# Patient Record
Sex: FEMALE | Race: WHITE | NOT HISPANIC OR LATINO
[De-identification: names, ages, dates, MRNs, and addresses within clinical notes are randomized per-mention and may not be internally consistent; named-entity substitution may affect disease eponyms.]

---

## 2017-03-29 ENCOUNTER — APPOINTMENT (OUTPATIENT)
Dept: ULTRASOUND IMAGING | Facility: HOSPITAL | Age: 57
End: 2017-03-29

## 2017-06-27 ENCOUNTER — APPOINTMENT (OUTPATIENT)
Dept: ULTRASOUND IMAGING | Facility: HOSPITAL | Age: 57
End: 2017-06-27

## 2017-06-27 ENCOUNTER — OUTPATIENT (OUTPATIENT)
Dept: OUTPATIENT SERVICES | Facility: HOSPITAL | Age: 57
LOS: 1 days | End: 2017-06-27
Payer: COMMERCIAL

## 2017-06-27 PROCEDURE — 76536 US EXAM OF HEAD AND NECK: CPT

## 2017-06-27 PROCEDURE — 76536 US EXAM OF HEAD AND NECK: CPT | Mod: 26

## 2017-10-11 ENCOUNTER — APPOINTMENT (OUTPATIENT)
Dept: OBGYN | Facility: CLINIC | Age: 57
End: 2017-10-11
Payer: COMMERCIAL

## 2017-10-11 VITALS
DIASTOLIC BLOOD PRESSURE: 78 MMHG | HEART RATE: 101 BPM | SYSTOLIC BLOOD PRESSURE: 136 MMHG | BODY MASS INDEX: 31.66 KG/M2 | OXYGEN SATURATION: 98 % | RESPIRATION RATE: 20 BRPM | HEIGHT: 66 IN | WEIGHT: 197 LBS

## 2017-10-11 PROCEDURE — 99396 PREV VISIT EST AGE 40-64: CPT

## 2017-10-11 PROCEDURE — 99213 OFFICE O/P EST LOW 20 MIN: CPT | Mod: 25

## 2017-10-12 DIAGNOSIS — N76.3 SUBACUTE AND CHRONIC VULVITIS: ICD-10-CM

## 2017-10-12 LAB — HPV HIGH+LOW RISK DNA PNL CVX: NOT DETECTED

## 2017-10-18 ENCOUNTER — APPOINTMENT (OUTPATIENT)
Dept: OBGYN | Facility: CLINIC | Age: 57
End: 2017-10-18

## 2017-10-18 VITALS
HEIGHT: 66 IN | HEART RATE: 86 BPM | SYSTOLIC BLOOD PRESSURE: 130 MMHG | DIASTOLIC BLOOD PRESSURE: 80 MMHG | BODY MASS INDEX: 31.66 KG/M2 | OXYGEN SATURATION: 98 % | WEIGHT: 197 LBS

## 2017-10-19 LAB — CYTOLOGY CVX/VAG DOC THIN PREP: NORMAL

## 2017-11-12 ENCOUNTER — FORM ENCOUNTER (OUTPATIENT)
Age: 57
End: 2017-11-12

## 2017-11-13 ENCOUNTER — OUTPATIENT (OUTPATIENT)
Dept: OUTPATIENT SERVICES | Facility: HOSPITAL | Age: 57
LOS: 1 days | End: 2017-11-13
Payer: COMMERCIAL

## 2017-11-13 ENCOUNTER — APPOINTMENT (OUTPATIENT)
Dept: ULTRASOUND IMAGING | Facility: HOSPITAL | Age: 57
End: 2017-11-13
Payer: COMMERCIAL

## 2017-11-13 ENCOUNTER — APPOINTMENT (OUTPATIENT)
Dept: MAMMOGRAPHY | Facility: HOSPITAL | Age: 57
End: 2017-11-13
Payer: COMMERCIAL

## 2017-11-13 PROCEDURE — G0202: CPT | Mod: 26

## 2017-11-13 PROCEDURE — 76830 TRANSVAGINAL US NON-OB: CPT

## 2017-11-13 PROCEDURE — 76856 US EXAM PELVIC COMPLETE: CPT

## 2017-11-13 PROCEDURE — 77067 SCR MAMMO BI INCL CAD: CPT

## 2017-11-13 PROCEDURE — 76830 TRANSVAGINAL US NON-OB: CPT | Mod: 26

## 2017-11-13 PROCEDURE — 76856 US EXAM PELVIC COMPLETE: CPT | Mod: 26

## 2017-11-13 PROCEDURE — 77063 BREAST TOMOSYNTHESIS BI: CPT

## 2017-11-13 PROCEDURE — 77063 BREAST TOMOSYNTHESIS BI: CPT | Mod: 26

## 2017-11-16 ENCOUNTER — FORM ENCOUNTER (OUTPATIENT)
Age: 57
End: 2017-11-16

## 2017-11-16 ENCOUNTER — APPOINTMENT (OUTPATIENT)
Dept: OBGYN | Facility: CLINIC | Age: 57
End: 2017-11-16
Payer: COMMERCIAL

## 2017-11-16 VITALS
SYSTOLIC BLOOD PRESSURE: 140 MMHG | OXYGEN SATURATION: 96 % | HEART RATE: 94 BPM | DIASTOLIC BLOOD PRESSURE: 80 MMHG | RESPIRATION RATE: 18 BRPM

## 2017-11-16 PROCEDURE — 58100 BIOPSY OF UTERUS LINING: CPT

## 2017-11-16 PROCEDURE — 99214 OFFICE O/P EST MOD 30 MIN: CPT | Mod: 25

## 2017-11-17 ENCOUNTER — APPOINTMENT (OUTPATIENT)
Dept: MAMMOGRAPHY | Facility: HOSPITAL | Age: 57
End: 2017-11-17

## 2017-11-17 ENCOUNTER — APPOINTMENT (OUTPATIENT)
Dept: ULTRASOUND IMAGING | Facility: HOSPITAL | Age: 57
End: 2017-11-17
Payer: COMMERCIAL

## 2017-11-17 ENCOUNTER — OUTPATIENT (OUTPATIENT)
Dept: OUTPATIENT SERVICES | Facility: HOSPITAL | Age: 57
LOS: 1 days | End: 2017-11-17
Payer: COMMERCIAL

## 2017-11-17 PROCEDURE — 77066 DX MAMMO INCL CAD BI: CPT

## 2017-11-17 PROCEDURE — 76641 ULTRASOUND BREAST COMPLETE: CPT

## 2017-11-17 PROCEDURE — G0279: CPT | Mod: 26

## 2017-11-17 PROCEDURE — 76641 ULTRASOUND BREAST COMPLETE: CPT | Mod: 26

## 2017-11-17 PROCEDURE — G0204: CPT | Mod: 26

## 2017-11-17 PROCEDURE — G0279: CPT

## 2017-11-27 LAB — CORE LAB BIOPSY: NORMAL

## 2017-11-29 DIAGNOSIS — Z00.8 ENCOUNTER FOR OTHER GENERAL EXAMINATION: ICD-10-CM

## 2018-06-17 ENCOUNTER — EMERGENCY (EMERGENCY)
Facility: HOSPITAL | Age: 58
LOS: 1 days | Discharge: HOME | End: 2018-06-17
Attending: EMERGENCY MEDICINE | Admitting: EMERGENCY MEDICINE
Payer: COMMERCIAL

## 2018-06-17 VITALS
TEMPERATURE: 100 F | RESPIRATION RATE: 16 BRPM | SYSTOLIC BLOOD PRESSURE: 134 MMHG | HEART RATE: 98 BPM | OXYGEN SATURATION: 95 % | DIASTOLIC BLOOD PRESSURE: 83 MMHG

## 2018-06-17 DIAGNOSIS — E89.0 POSTPROCEDURAL HYPOTHYROIDISM: Chronic | ICD-10-CM

## 2018-06-17 PROCEDURE — 99283 EMERGENCY DEPT VISIT LOW MDM: CPT | Mod: 25

## 2018-06-17 PROCEDURE — 99282 EMERGENCY DEPT VISIT SF MDM: CPT | Mod: 25

## 2018-06-17 PROCEDURE — 12001 RPR S/N/AX/GEN/TRNK 2.5CM/<: CPT

## 2018-06-17 PROCEDURE — 90715 TDAP VACCINE 7 YRS/> IM: CPT

## 2018-06-17 PROCEDURE — 90471 IMMUNIZATION ADMIN: CPT

## 2018-06-17 RX ORDER — TETANUS TOXOID, REDUCED DIPHTHERIA TOXOID AND ACELLULAR PERTUSSIS VACCINE, ADSORBED 5; 2.5; 8; 8; 2.5 [IU]/.5ML; [IU]/.5ML; UG/.5ML; UG/.5ML; UG/.5ML
0.5 SUSPENSION INTRAMUSCULAR ONCE
Qty: 0 | Refills: 0 | Status: COMPLETED | OUTPATIENT
Start: 2018-06-17 | End: 2018-06-17

## 2018-06-17 RX ADMIN — TETANUS TOXOID, REDUCED DIPHTHERIA TOXOID AND ACELLULAR PERTUSSIS VACCINE, ADSORBED 0.5 MILLILITER(S): 5; 2.5; 8; 8; 2.5 SUSPENSION INTRAMUSCULAR at 20:50

## 2018-06-17 NOTE — ED PROVIDER NOTE - MEDICAL DECISION MAKING DETAILS
laceration: tetanus, wound care, sutures, return in 7 days for suture removal Mimi: laceration: tetanus, wound care, sutures, return in 7 days for suture removal

## 2018-06-17 NOTE — ED PROVIDER NOTE - SKIN WOUND TYPE
right index finger: proximal laceration, flap, U shap, no signs of FB, tendon intact, positive radial pulses, superficial/LACERATION(S)

## 2018-06-17 NOTE — ED PROVIDER NOTE - OBJECTIVE STATEMENT
58 yr old female with hx of thyroid cancer presents c/o laceration to right index finger. Pt states she was washing a glass, and cut finger on glass just prior to arrival. Tetanus unknown. No other injuries. right hand dominant

## 2018-06-25 ENCOUNTER — EMERGENCY (EMERGENCY)
Facility: HOSPITAL | Age: 58
LOS: 1 days | Discharge: HOME | End: 2018-06-25
Attending: EMERGENCY MEDICINE | Admitting: EMERGENCY MEDICINE
Payer: COMMERCIAL

## 2018-06-25 VITALS
WEIGHT: 196.21 LBS | DIASTOLIC BLOOD PRESSURE: 89 MMHG | HEART RATE: 81 BPM | SYSTOLIC BLOOD PRESSURE: 136 MMHG | OXYGEN SATURATION: 98 % | TEMPERATURE: 99 F | RESPIRATION RATE: 16 BRPM

## 2018-06-25 DIAGNOSIS — E89.0 POSTPROCEDURAL HYPOTHYROIDISM: Chronic | ICD-10-CM

## 2018-06-25 DIAGNOSIS — S61.011D LACERATION WITHOUT FOREIGN BODY OF RIGHT THUMB WITHOUT DAMAGE TO NAIL, SUBSEQUENT ENCOUNTER: ICD-10-CM

## 2018-06-25 PROCEDURE — 99282 EMERGENCY DEPT VISIT SF MDM: CPT

## 2018-06-25 NOTE — ED PROVIDER NOTE - OBJECTIVE STATEMENT
58 y./o F presents to ED fro suture removal from thumb. was sutures 1 week ago, denies pus discharge from wound

## 2018-06-26 NOTE — ED ADULT NURSE NOTE - OBJECTIVE STATEMENT
pt here for removal of sutures denies fever chills or drainage from the wound  offers no complaints at this time

## 2018-07-01 ENCOUNTER — TRANSCRIPTION ENCOUNTER (OUTPATIENT)
Age: 58
End: 2018-07-01

## 2018-11-12 PROBLEM — E03.9 HYPOTHYROIDISM, UNSPECIFIED: Chronic | Status: ACTIVE | Noted: 2018-06-17

## 2018-11-26 ENCOUNTER — APPOINTMENT (OUTPATIENT)
Dept: OBGYN | Facility: CLINIC | Age: 58
End: 2018-11-26
Payer: COMMERCIAL

## 2018-11-26 VITALS
OXYGEN SATURATION: 97 % | DIASTOLIC BLOOD PRESSURE: 80 MMHG | SYSTOLIC BLOOD PRESSURE: 130 MMHG | HEIGHT: 66 IN | WEIGHT: 190 LBS | BODY MASS INDEX: 30.53 KG/M2 | HEART RATE: 82 BPM

## 2018-11-26 PROCEDURE — 99396 PREV VISIT EST AGE 40-64: CPT

## 2018-11-28 LAB — HPV HIGH+LOW RISK DNA PNL CVX: NOT DETECTED

## 2018-12-02 LAB — CYTOLOGY CVX/VAG DOC THIN PREP: NORMAL

## 2019-02-19 ENCOUNTER — APPOINTMENT (OUTPATIENT)
Dept: MAMMOGRAPHY | Facility: HOSPITAL | Age: 59
End: 2019-02-19

## 2019-02-19 ENCOUNTER — APPOINTMENT (OUTPATIENT)
Dept: ULTRASOUND IMAGING | Facility: HOSPITAL | Age: 59
End: 2019-02-19

## 2019-03-12 ENCOUNTER — RX RENEWAL (OUTPATIENT)
Age: 59
End: 2019-03-12

## 2019-04-15 ENCOUNTER — FORM ENCOUNTER (OUTPATIENT)
Age: 59
End: 2019-04-15

## 2019-04-16 ENCOUNTER — OUTPATIENT (OUTPATIENT)
Dept: OUTPATIENT SERVICES | Facility: HOSPITAL | Age: 59
LOS: 1 days | End: 2019-04-16
Payer: MEDICAID

## 2019-04-16 ENCOUNTER — APPOINTMENT (OUTPATIENT)
Dept: ULTRASOUND IMAGING | Facility: HOSPITAL | Age: 59
End: 2019-04-16
Payer: MEDICAID

## 2019-04-16 ENCOUNTER — APPOINTMENT (OUTPATIENT)
Dept: ULTRASOUND IMAGING | Facility: HOSPITAL | Age: 59
End: 2019-04-16

## 2019-04-16 ENCOUNTER — APPOINTMENT (OUTPATIENT)
Dept: MAMMOGRAPHY | Facility: HOSPITAL | Age: 59
End: 2019-04-16
Payer: MEDICAID

## 2019-04-16 DIAGNOSIS — E89.0 POSTPROCEDURAL HYPOTHYROIDISM: Chronic | ICD-10-CM

## 2019-04-16 DIAGNOSIS — Z00.8 ENCOUNTER FOR OTHER GENERAL EXAMINATION: ICD-10-CM

## 2019-04-16 PROCEDURE — 77067 SCR MAMMO BI INCL CAD: CPT | Mod: 26

## 2019-04-16 PROCEDURE — 77063 BREAST TOMOSYNTHESIS BI: CPT

## 2019-04-16 PROCEDURE — 77067 SCR MAMMO BI INCL CAD: CPT

## 2019-04-16 PROCEDURE — 76830 TRANSVAGINAL US NON-OB: CPT | Mod: 26

## 2019-04-16 PROCEDURE — 76856 US EXAM PELVIC COMPLETE: CPT | Mod: 26

## 2019-04-16 PROCEDURE — 77063 BREAST TOMOSYNTHESIS BI: CPT | Mod: 26

## 2019-04-16 PROCEDURE — 76641 ULTRASOUND BREAST COMPLETE: CPT | Mod: 26,50

## 2019-04-16 PROCEDURE — 76830 TRANSVAGINAL US NON-OB: CPT

## 2019-04-16 PROCEDURE — 76641 ULTRASOUND BREAST COMPLETE: CPT

## 2019-04-16 PROCEDURE — 76536 US EXAM OF HEAD AND NECK: CPT

## 2019-04-16 PROCEDURE — 76536 US EXAM OF HEAD AND NECK: CPT | Mod: 26

## 2019-04-16 PROCEDURE — 76856 US EXAM PELVIC COMPLETE: CPT

## 2019-04-24 ENCOUNTER — TRANSCRIPTION ENCOUNTER (OUTPATIENT)
Age: 59
End: 2019-04-24

## 2019-05-02 ENCOUNTER — APPOINTMENT (OUTPATIENT)
Dept: CARDIOLOGY | Facility: CLINIC | Age: 59
End: 2019-05-02
Payer: MEDICAID

## 2019-05-02 ENCOUNTER — NON-APPOINTMENT (OUTPATIENT)
Age: 59
End: 2019-05-02

## 2019-05-02 VITALS — DIASTOLIC BLOOD PRESSURE: 100 MMHG | SYSTOLIC BLOOD PRESSURE: 160 MMHG

## 2019-05-02 VITALS
BODY MASS INDEX: 29.89 KG/M2 | RESPIRATION RATE: 17 BRPM | DIASTOLIC BLOOD PRESSURE: 94 MMHG | HEIGHT: 66 IN | HEART RATE: 82 BPM | SYSTOLIC BLOOD PRESSURE: 153 MMHG | OXYGEN SATURATION: 97 % | WEIGHT: 186 LBS

## 2019-05-02 PROCEDURE — 99204 OFFICE O/P NEW MOD 45 MIN: CPT

## 2019-05-02 PROCEDURE — 93306 TTE W/DOPPLER COMPLETE: CPT

## 2019-05-02 PROCEDURE — 93000 ELECTROCARDIOGRAM COMPLETE: CPT

## 2019-05-02 NOTE — REASON FOR VISIT
[Consultation] : a consultation regarding [FreeTextEntry2] : pt c/o occasional dyspnea at high workload, no chg, no sscp

## 2019-05-02 NOTE — DISCUSSION/SUMMARY
[Hypertension] : hypertension [Outpatient Evaluation] : outpatient evaluation [Ambulatory BP Monitoring] : ambulatory blood pressure monitoring [Stress Testing] : stress testing [Exercise Regimen] : an exercise regimen [Weight Loss] : weight loss [Sodium Restriction] : sodium restriction [Echocardiogram] : an echocardiogram [Treadmill Exercise Test] : a treadmill stress test

## 2019-05-02 NOTE — PHYSICAL EXAM
[General Appearance - Well Developed] : well developed [Well Groomed] : well groomed [Normal Appearance] : normal appearance [No Deformities] : no deformities [General Appearance - Well Nourished] : well nourished [Normal Conjunctiva] : the conjunctiva exhibited no abnormalities [General Appearance - In No Acute Distress] : no acute distress [Eyelids - No Xanthelasma] : the eyelids demonstrated no xanthelasmas [Normal Oral Mucosa] : normal oral mucosa [No Oral Pallor] : no oral pallor [No Oral Cyanosis] : no oral cyanosis [Normal Jugular Venous A Waves Present] : normal jugular venous A waves present [Normal Jugular Venous V Waves Present] : normal jugular venous V waves present [No Jugular Venous Stokes A Waves] : no jugular venous stokes A waves [Normal Rate] : normal [Normal S1] : normal S1 [Normal S2] : normal S2 [No Murmur] : no murmurs heard [2+] : left 2+ [No Abnormalities] : the abdominal aorta was not enlarged and no bruit was heard [No Pitting Edema] : no pitting edema present [Respiration, Rhythm And Depth] : normal respiratory rhythm and effort [Auscultation Breath Sounds / Voice Sounds] : lungs were clear to auscultation bilaterally [Exaggerated Use Of Accessory Muscles For Inspiration] : no accessory muscle use [Abdomen Soft] : soft [Abdomen Tenderness] : non-tender [Abdomen Mass (___ Cm)] : no abdominal mass palpated [Abnormal Walk] : normal gait [Gait - Sufficient For Exercise Testing] : the gait was sufficient for exercise testing [Nail Clubbing] : no clubbing of the fingernails [Petechial Hemorrhages (___cm)] : no petechial hemorrhages [Cyanosis, Localized] : no localized cyanosis [Skin Color & Pigmentation] : normal skin color and pigmentation [] : no rash [No Venous Stasis] : no venous stasis [No Skin Ulcers] : no skin ulcer [Skin Lesions] : no skin lesions [No Xanthoma] : no  xanthoma was observed [Oriented To Time, Place, And Person] : oriented to person, place, and time [Affect] : the affect was normal [Mood] : the mood was normal [No Anxiety] : not feeling anxious [S3] : no S3 [Right Carotid Bruit] : no bruit heard over the right carotid [S4] : no S4 [Left Carotid Bruit] : no bruit heard over the left carotid [Right Femoral Bruit] : no bruit heard over the right femoral artery [Left Femoral Bruit] : no bruit heard over the left femoral artery

## 2019-05-07 ENCOUNTER — APPOINTMENT (OUTPATIENT)
Dept: OBGYN | Facility: CLINIC | Age: 59
End: 2019-05-07

## 2019-05-08 ENCOUNTER — TRANSCRIPTION ENCOUNTER (OUTPATIENT)
Age: 59
End: 2019-05-08

## 2019-05-11 ENCOUNTER — TRANSCRIPTION ENCOUNTER (OUTPATIENT)
Age: 59
End: 2019-05-11

## 2019-05-13 ENCOUNTER — TRANSCRIPTION ENCOUNTER (OUTPATIENT)
Age: 59
End: 2019-05-13

## 2019-05-14 ENCOUNTER — APPOINTMENT (OUTPATIENT)
Dept: SURGERY | Facility: CLINIC | Age: 59
End: 2019-05-14
Payer: MEDICAID

## 2019-05-14 VITALS
HEART RATE: 77 BPM | WEIGHT: 186 LBS | OXYGEN SATURATION: 98 % | TEMPERATURE: 98.1 F | HEIGHT: 66 IN | SYSTOLIC BLOOD PRESSURE: 122 MMHG | DIASTOLIC BLOOD PRESSURE: 80 MMHG | BODY MASS INDEX: 29.89 KG/M2

## 2019-05-14 PROCEDURE — 99202 OFFICE O/P NEW SF 15 MIN: CPT

## 2019-05-14 RX ORDER — MULTIVITAMIN/IRON/FOLIC ACID 18MG-0.4MG
600-400 TABLET ORAL
Refills: 0 | Status: DISCONTINUED | COMMUNITY
End: 2019-05-14

## 2019-05-14 NOTE — REASON FOR VISIT
[Consultation] : a consultation visit No joint pain, swelling or deformity; no limitation of movement

## 2019-05-22 ENCOUNTER — APPOINTMENT (OUTPATIENT)
Dept: CARDIOLOGY | Facility: CLINIC | Age: 59
End: 2019-05-22
Payer: MEDICAID

## 2019-05-22 PROCEDURE — 93015 CV STRESS TEST SUPVJ I&R: CPT

## 2019-05-23 ENCOUNTER — TRANSCRIPTION ENCOUNTER (OUTPATIENT)
Age: 59
End: 2019-05-23

## 2019-06-07 ENCOUNTER — APPOINTMENT (OUTPATIENT)
Dept: SURGERY | Facility: CLINIC | Age: 59
End: 2019-06-07
Payer: MEDICAID

## 2019-06-07 VITALS
SYSTOLIC BLOOD PRESSURE: 140 MMHG | BODY MASS INDEX: 27.32 KG/M2 | WEIGHT: 170 LBS | DIASTOLIC BLOOD PRESSURE: 80 MMHG | HEIGHT: 66 IN | OXYGEN SATURATION: 98 % | HEART RATE: 106 BPM

## 2019-06-07 PROCEDURE — 99213 OFFICE O/P EST LOW 20 MIN: CPT

## 2019-06-11 ENCOUNTER — FORM ENCOUNTER (OUTPATIENT)
Age: 59
End: 2019-06-11

## 2019-06-12 ENCOUNTER — RESULT REVIEW (OUTPATIENT)
Age: 59
End: 2019-06-12

## 2019-06-12 ENCOUNTER — APPOINTMENT (OUTPATIENT)
Dept: ULTRASOUND IMAGING | Facility: IMAGING CENTER | Age: 59
End: 2019-06-12
Payer: MEDICAID

## 2019-06-12 ENCOUNTER — OUTPATIENT (OUTPATIENT)
Dept: OUTPATIENT SERVICES | Facility: HOSPITAL | Age: 59
LOS: 1 days | End: 2019-06-12
Payer: MEDICAID

## 2019-06-12 DIAGNOSIS — R59.0 LOCALIZED ENLARGED LYMPH NODES: ICD-10-CM

## 2019-06-12 DIAGNOSIS — E89.0 POSTPROCEDURAL HYPOTHYROIDISM: Chronic | ICD-10-CM

## 2019-06-12 PROCEDURE — 88305 TISSUE EXAM BY PATHOLOGIST: CPT | Mod: 26

## 2019-06-12 PROCEDURE — 10005 FNA BX W/US GDN 1ST LES: CPT

## 2019-06-12 PROCEDURE — 88305 TISSUE EXAM BY PATHOLOGIST: CPT

## 2019-06-12 PROCEDURE — 88188 FLOWCYTOMETRY/READ 9-15: CPT

## 2019-06-12 PROCEDURE — 88173 CYTOPATH EVAL FNA REPORT: CPT | Mod: 26

## 2019-06-12 PROCEDURE — 87205 SMEAR GRAM STAIN: CPT

## 2019-06-12 PROCEDURE — 88184 FLOWCYTOMETRY/ TC 1 MARKER: CPT

## 2019-06-12 PROCEDURE — 88172 CYTP DX EVAL FNA 1ST EA SITE: CPT

## 2019-06-12 PROCEDURE — 88173 CYTOPATH EVAL FNA REPORT: CPT

## 2019-06-12 PROCEDURE — 88185 FLOWCYTOMETRY/TC ADD-ON: CPT

## 2019-06-12 NOTE — REASON FOR VISIT
[Initial Consultation] : an initial consultation for [Other: _____] : [unfilled] [FreeTextEntry2] : cervical Lymph nodes

## 2019-06-12 NOTE — ASSESSMENT
[FreeTextEntry1] : Patient with prior hx of thyroid cancer with abnormal LN seen on US with normal PE and CT scan.  I have requested an US guided FNA of right submandibular LN if seen by Dr. Mark Cary.  If no suspicious LNs seen biopsy will not be performed.  Patient will contact my office to review results and determine need for intervention.

## 2019-06-12 NOTE — PHYSICAL EXAM
[Normal] : assessment of respiratory effort is normal [de-identified] : no cervical or supraclavicular adenopathy, trachea midline, incision well healed, no palpable masses.

## 2019-06-12 NOTE — CONSULT LETTER
[Dear  ___] : Dear  [unfilled], [Consult Closing:] : Thank you very much for allowing me to participate in the care of this patient.  If you have any questions, please do not hesitate to contact me. [Consult Letter:] : I had the pleasure of evaluating your patient, [unfilled]. [Please see my note below.] : Please see my note below. [DrAna  ___] : Dr. MIRANDA [FreeTextEntry3] : Sincerely yours,\par \par Gladis Carpenter MD, FACS\par Assistant Professor of Surgery\par [facility] [FreeTextEntry2] : Dr. Zack Napoles

## 2019-06-12 NOTE — HISTORY OF PRESENT ILLNESS
[de-identified] : Patient referred by Dr. Napoles for evaluation of cervical LNs after thyroidectomy 2008 for MNG with incidental thyroid cancer. Patient has been followed by Dr Rain with last US 4/2019 : right submandibular 2.2cm abn lymph node and left 1.1 and 1.2cm LNs.  CT of neck 5/23/19 no abnormal LNs seen.  Denies F/C/S, change in weight, recent dental work or history of skin cancer,.

## 2019-06-13 LAB — TM INTERPRETATION: SIGNIFICANT CHANGE UP

## 2019-09-12 ENCOUNTER — RX RENEWAL (OUTPATIENT)
Age: 59
End: 2019-09-12

## 2020-01-13 ENCOUNTER — RX RENEWAL (OUTPATIENT)
Age: 60
End: 2020-01-13

## 2020-02-21 ENCOUNTER — TRANSCRIPTION ENCOUNTER (OUTPATIENT)
Age: 60
End: 2020-02-21

## 2020-02-24 ENCOUNTER — TRANSCRIPTION ENCOUNTER (OUTPATIENT)
Age: 60
End: 2020-02-24

## 2020-05-14 ENCOUNTER — RX RENEWAL (OUTPATIENT)
Age: 60
End: 2020-05-14

## 2020-11-10 ENCOUNTER — RX RENEWAL (OUTPATIENT)
Age: 60
End: 2020-11-10

## 2021-03-01 ENCOUNTER — APPOINTMENT (OUTPATIENT)
Dept: OBGYN | Facility: CLINIC | Age: 61
End: 2021-03-01

## 2021-04-02 ENCOUNTER — TRANSCRIPTION ENCOUNTER (OUTPATIENT)
Age: 61
End: 2021-04-02

## 2021-05-02 ENCOUNTER — TRANSCRIPTION ENCOUNTER (OUTPATIENT)
Age: 61
End: 2021-05-02

## 2021-05-03 ENCOUNTER — RX RENEWAL (OUTPATIENT)
Age: 61
End: 2021-05-03

## 2021-11-15 ENCOUNTER — RX RENEWAL (OUTPATIENT)
Age: 61
End: 2021-11-15

## 2022-04-28 ENCOUNTER — RX RENEWAL (OUTPATIENT)
Age: 62
End: 2022-04-28

## 2022-05-18 ENCOUNTER — NON-APPOINTMENT (OUTPATIENT)
Age: 62
End: 2022-05-18

## 2022-05-22 ENCOUNTER — RX RENEWAL (OUTPATIENT)
Age: 62
End: 2022-05-22

## 2022-06-16 ENCOUNTER — NON-APPOINTMENT (OUTPATIENT)
Age: 62
End: 2022-06-16

## 2022-06-16 ENCOUNTER — RX RENEWAL (OUTPATIENT)
Age: 62
End: 2022-06-16

## 2022-06-16 ENCOUNTER — APPOINTMENT (OUTPATIENT)
Dept: CARDIOLOGY | Facility: CLINIC | Age: 62
End: 2022-06-16
Payer: COMMERCIAL

## 2022-06-16 VITALS
BODY MASS INDEX: 31.5 KG/M2 | HEIGHT: 66 IN | RESPIRATION RATE: 19 BRPM | SYSTOLIC BLOOD PRESSURE: 164 MMHG | OXYGEN SATURATION: 99 % | WEIGHT: 196 LBS | HEART RATE: 82 BPM | DIASTOLIC BLOOD PRESSURE: 95 MMHG | TEMPERATURE: 96.2 F

## 2022-06-16 VITALS — SYSTOLIC BLOOD PRESSURE: 165 MMHG | DIASTOLIC BLOOD PRESSURE: 90 MMHG

## 2022-06-16 PROCEDURE — 93000 ELECTROCARDIOGRAM COMPLETE: CPT

## 2022-06-16 PROCEDURE — 99215 OFFICE O/P EST HI 40 MIN: CPT

## 2022-06-16 RX ORDER — VALSARTAN 160 MG/1
160 TABLET, COATED ORAL DAILY
Qty: 30 | Refills: 3 | Status: DISCONTINUED | COMMUNITY
Start: 2019-05-22 | End: 2022-06-16

## 2022-06-16 RX ORDER — LOSARTAN POTASSIUM 50 MG/1
50 TABLET, FILM COATED ORAL DAILY
Qty: 90 | Refills: 1 | Status: DISCONTINUED | COMMUNITY
Start: 2020-05-13 | End: 2022-06-16

## 2022-06-16 RX ORDER — IRBESARTAN 150 MG/1
150 TABLET ORAL
Qty: 90 | Refills: 0 | Status: DISCONTINUED | COMMUNITY
Start: 2020-05-13 | End: 2022-06-16

## 2022-06-17 NOTE — DISCUSSION/SUMMARY
[Hypertension] : hypertension [Outpatient Evaluation] : outpatient evaluation [Ambulatory BP Monitoring] : ambulatory blood pressure monitoring [Exercise Regimen] : an exercise regimen [Weight Loss] : weight loss [Echocardiogram] : an echocardiogram [Minutes Spent: ___] : for [unfilled] ~Uminutes [FreeTextEntry2] : last seen 2019

## 2022-06-17 NOTE — PHYSICAL EXAM
[General Appearance - Well Developed] : well developed [Normal Appearance] : normal appearance [Well Groomed] : well groomed [General Appearance - Well Nourished] : well nourished [No Deformities] : no deformities [General Appearance - In No Acute Distress] : no acute distress [Normal Conjunctiva] : the conjunctiva exhibited no abnormalities [Eyelids - No Xanthelasma] : the eyelids demonstrated no xanthelasmas [Normal Oral Mucosa] : normal oral mucosa [No Oral Pallor] : no oral pallor [No Oral Cyanosis] : no oral cyanosis [Normal Jugular Venous A Waves Present] : normal jugular venous A waves present [Normal Jugular Venous V Waves Present] : normal jugular venous V waves present [No Jugular Venous Stokes A Waves] : no jugular venous stokes A waves [Normal Rate] : normal [Normal S1] : normal S1 [Normal S2] : normal S2 [No Murmur] : no murmurs heard [2+] : left 2+ [No Abnormalities] : the abdominal aorta was not enlarged and no bruit was heard [No Pitting Edema] : no pitting edema present [Respiration, Rhythm And Depth] : normal respiratory rhythm and effort [Exaggerated Use Of Accessory Muscles For Inspiration] : no accessory muscle use [Auscultation Breath Sounds / Voice Sounds] : lungs were clear to auscultation bilaterally [Abdomen Soft] : soft [Abdomen Tenderness] : non-tender [Abdomen Mass (___ Cm)] : no abdominal mass palpated [Abnormal Walk] : normal gait [Gait - Sufficient For Exercise Testing] : the gait was sufficient for exercise testing [Nail Clubbing] : no clubbing of the fingernails [Cyanosis, Localized] : no localized cyanosis [Petechial Hemorrhages (___cm)] : no petechial hemorrhages [Skin Color & Pigmentation] : normal skin color and pigmentation [] : no rash [No Venous Stasis] : no venous stasis [Skin Lesions] : no skin lesions [No Skin Ulcers] : no skin ulcer [No Xanthoma] : no  xanthoma was observed [Oriented To Time, Place, And Person] : oriented to person, place, and time [Affect] : the affect was normal [Mood] : the mood was normal [No Anxiety] : not feeling anxious [S3] : no S3 [S4] : no S4 [Right Carotid Bruit] : no bruit heard over the right carotid [Left Carotid Bruit] : no bruit heard over the left carotid [Right Femoral Bruit] : no bruit heard over the right femoral artery [Left Femoral Bruit] : no bruit heard over the left femoral artery

## 2022-06-17 NOTE — REASON FOR VISIT
[Consultation] : a consultation regarding [FreeTextEntry2] : pt c/o occasional dyspnea at high workload, no chg, no sscp, last seen 2019

## 2022-07-05 ENCOUNTER — APPOINTMENT (OUTPATIENT)
Dept: OBGYN | Facility: CLINIC | Age: 62
End: 2022-07-05

## 2022-07-05 VITALS
DIASTOLIC BLOOD PRESSURE: 72 MMHG | WEIGHT: 190 LBS | SYSTOLIC BLOOD PRESSURE: 118 MMHG | OXYGEN SATURATION: 98 % | BODY MASS INDEX: 30.53 KG/M2 | HEART RATE: 81 BPM | TEMPERATURE: 97.5 F | HEIGHT: 66 IN

## 2022-07-05 PROCEDURE — 99396 PREV VISIT EST AGE 40-64: CPT

## 2022-07-05 RX ORDER — ACETAMINOPHEN 325 MG
TABLET ORAL
Refills: 0 | Status: DISCONTINUED | COMMUNITY
End: 2022-07-05

## 2022-07-05 NOTE — PHYSICAL EXAM
[Chaperone Present] : A chaperone was present in the examining room during all aspects of the physical examination [FreeTextEntry1] : CINDY Perdue [Appropriately responsive] : appropriately responsive [Alert] : alert [No Acute Distress] : no acute distress [No Lymphadenopathy] : no lymphadenopathy [Non-tender] : non-tender [Non-distended] : non-distended [No HSM] : No HSM [No Lesions] : no lesions [No Mass] : no mass [Oriented x3] : oriented x3 [Examination Of The Breasts] : a normal appearance [No Discharge] : no discharge [No Masses] : no breast masses were palpable [Labia Majora] : normal [Labia Minora] : normal [Atrophy] : atrophy [No Bleeding] : There was no active vaginal bleeding [Soft] : soft [Normal] : normal [Normal Position] : in a normal position [Tenderness] : nontender [Enlarged ___ wks] : not enlarged [Mass ___ cm] : no uterine mass was palpated [Uterine Adnexae] : normal

## 2022-07-05 NOTE — HISTORY OF PRESENT ILLNESS
[N] : Patient is not sexually active [LMPDate] : age 57 [[facility] : 2 [[facility]xLiving] : 2 [PGHxABInduced] : 1 [FreeTextEntry1] :  X 2

## 2022-07-10 LAB
CYTOLOGY CVX/VAG DOC THIN PREP: NORMAL
HPV HIGH+LOW RISK DNA PNL CVX: NOT DETECTED

## 2022-07-18 ENCOUNTER — APPOINTMENT (OUTPATIENT)
Dept: CT IMAGING | Facility: HOSPITAL | Age: 62
End: 2022-07-18

## 2022-07-18 ENCOUNTER — APPOINTMENT (OUTPATIENT)
Dept: RADIOLOGY | Facility: HOSPITAL | Age: 62
End: 2022-07-18

## 2022-07-18 ENCOUNTER — APPOINTMENT (OUTPATIENT)
Dept: ULTRASOUND IMAGING | Facility: HOSPITAL | Age: 62
End: 2022-07-18

## 2022-07-18 ENCOUNTER — OUTPATIENT (OUTPATIENT)
Dept: OUTPATIENT SERVICES | Facility: HOSPITAL | Age: 62
LOS: 1 days | End: 2022-07-18
Payer: COMMERCIAL

## 2022-07-18 DIAGNOSIS — E89.0 POSTPROCEDURAL HYPOTHYROIDISM: Chronic | ICD-10-CM

## 2022-07-18 DIAGNOSIS — Z00.8 ENCOUNTER FOR OTHER GENERAL EXAMINATION: ICD-10-CM

## 2022-07-18 PROCEDURE — 77080 DXA BONE DENSITY AXIAL: CPT

## 2022-07-18 PROCEDURE — 76700 US EXAM ABDOM COMPLETE: CPT | Mod: 26

## 2022-07-18 PROCEDURE — 77080 DXA BONE DENSITY AXIAL: CPT | Mod: 26

## 2022-07-18 PROCEDURE — 76700 US EXAM ABDOM COMPLETE: CPT

## 2022-07-28 ENCOUNTER — OUTPATIENT (OUTPATIENT)
Dept: OUTPATIENT SERVICES | Facility: HOSPITAL | Age: 62
LOS: 1 days | End: 2022-07-28
Payer: COMMERCIAL

## 2022-07-28 ENCOUNTER — APPOINTMENT (OUTPATIENT)
Dept: ULTRASOUND IMAGING | Facility: HOSPITAL | Age: 62
End: 2022-07-28

## 2022-07-28 DIAGNOSIS — Z00.8 ENCOUNTER FOR OTHER GENERAL EXAMINATION: ICD-10-CM

## 2022-07-28 DIAGNOSIS — E89.0 POSTPROCEDURAL HYPOTHYROIDISM: Chronic | ICD-10-CM

## 2022-07-28 PROCEDURE — 76536 US EXAM OF HEAD AND NECK: CPT

## 2022-07-28 PROCEDURE — 76536 US EXAM OF HEAD AND NECK: CPT | Mod: 26

## 2022-08-15 ENCOUNTER — APPOINTMENT (OUTPATIENT)
Dept: ULTRASOUND IMAGING | Facility: HOSPITAL | Age: 62
End: 2022-08-15

## 2022-08-15 ENCOUNTER — RESULT REVIEW (OUTPATIENT)
Age: 62
End: 2022-08-15

## 2022-08-15 ENCOUNTER — APPOINTMENT (OUTPATIENT)
Dept: MAMMOGRAPHY | Facility: HOSPITAL | Age: 62
End: 2022-08-15

## 2022-08-15 ENCOUNTER — OUTPATIENT (OUTPATIENT)
Dept: OUTPATIENT SERVICES | Facility: HOSPITAL | Age: 62
LOS: 1 days | End: 2022-08-15
Payer: COMMERCIAL

## 2022-08-15 DIAGNOSIS — E89.0 POSTPROCEDURAL HYPOTHYROIDISM: Chronic | ICD-10-CM

## 2022-08-15 DIAGNOSIS — Z12.31 ENCOUNTER FOR SCREENING MAMMOGRAM FOR MALIGNANT NEOPLASM OF BREAST: ICD-10-CM

## 2022-08-15 PROCEDURE — 77067 SCR MAMMO BI INCL CAD: CPT | Mod: 26

## 2022-08-15 PROCEDURE — 77063 BREAST TOMOSYNTHESIS BI: CPT | Mod: 26

## 2022-08-15 PROCEDURE — 76641 ULTRASOUND BREAST COMPLETE: CPT | Mod: 26,50

## 2022-08-15 PROCEDURE — 77067 SCR MAMMO BI INCL CAD: CPT

## 2022-08-15 PROCEDURE — 77063 BREAST TOMOSYNTHESIS BI: CPT

## 2022-08-15 PROCEDURE — 76641 ULTRASOUND BREAST COMPLETE: CPT

## 2022-08-26 ENCOUNTER — RESULT REVIEW (OUTPATIENT)
Age: 62
End: 2022-08-26

## 2022-08-26 ENCOUNTER — OUTPATIENT (OUTPATIENT)
Dept: OUTPATIENT SERVICES | Facility: HOSPITAL | Age: 62
LOS: 1 days | End: 2022-08-26
Payer: COMMERCIAL

## 2022-08-26 ENCOUNTER — APPOINTMENT (OUTPATIENT)
Dept: ULTRASOUND IMAGING | Facility: CLINIC | Age: 62
End: 2022-08-26
Payer: COMMERCIAL

## 2022-08-26 DIAGNOSIS — R92.8 OTHER ABNORMAL AND INCONCLUSIVE FINDINGS ON DIAGNOSTIC IMAGING OF BREAST: ICD-10-CM

## 2022-08-26 DIAGNOSIS — Z00.8 ENCOUNTER FOR OTHER GENERAL EXAMINATION: ICD-10-CM

## 2022-08-26 DIAGNOSIS — E89.0 POSTPROCEDURAL HYPOTHYROIDISM: Chronic | ICD-10-CM

## 2022-08-26 PROCEDURE — 19083 BX BREAST 1ST LESION US IMAG: CPT

## 2022-08-26 PROCEDURE — 88305 TISSUE EXAM BY PATHOLOGIST: CPT | Mod: 26

## 2022-08-26 PROCEDURE — 77065 DX MAMMO INCL CAD UNI: CPT | Mod: 26,1L,LT

## 2022-08-26 PROCEDURE — 19083 BX BREAST 1ST LESION US IMAG: CPT | Mod: 1L,LT

## 2022-08-26 PROCEDURE — 88305 TISSUE EXAM BY PATHOLOGIST: CPT

## 2022-08-26 PROCEDURE — 88360 TUMOR IMMUNOHISTOCHEM/MANUAL: CPT | Mod: 26

## 2022-08-26 PROCEDURE — 77065 DX MAMMO INCL CAD UNI: CPT

## 2022-08-26 PROCEDURE — 88360 TUMOR IMMUNOHISTOCHEM/MANUAL: CPT

## 2022-08-29 LAB — SURGICAL PATHOLOGY STUDY: SIGNIFICANT CHANGE UP

## 2022-09-01 ENCOUNTER — RX RENEWAL (OUTPATIENT)
Age: 62
End: 2022-09-01

## 2022-09-06 ENCOUNTER — LABORATORY RESULT (OUTPATIENT)
Age: 62
End: 2022-09-06

## 2022-09-06 ENCOUNTER — APPOINTMENT (OUTPATIENT)
Dept: PLASTIC SURGERY | Facility: CLINIC | Age: 62
End: 2022-09-06

## 2022-09-06 ENCOUNTER — NON-APPOINTMENT (OUTPATIENT)
Age: 62
End: 2022-09-06

## 2022-09-06 VITALS
OXYGEN SATURATION: 96 % | HEART RATE: 72 BPM | SYSTOLIC BLOOD PRESSURE: 154 MMHG | DIASTOLIC BLOOD PRESSURE: 98 MMHG | BODY MASS INDEX: 28.12 KG/M2 | TEMPERATURE: 98.2 F | WEIGHT: 175 LBS | HEIGHT: 66 IN

## 2022-09-06 PROCEDURE — 99205 OFFICE O/P NEW HI 60 MIN: CPT

## 2022-09-06 NOTE — PHYSICAL EXAM
[Normocephalic] : normocephalic [Atraumatic] : atraumatic [Supple] : supple [No Supraclavicular Adenopathy] : no supraclavicular adenopathy [Examined in the supine and seated position] : examined in the supine and seated position [No dominant masses] : no dominant masses in right breast  [No Nipple Retraction] : no left nipple retraction [No Nipple Discharge] : no left nipple discharge [No Axillary Lymphadenopathy] : no left axillary lymphadenopathy [No Edema] : no edema [No Rashes] : no rashes [No Ulceration] : no ulceration [Bra Size: ___] : Bra Size: [unfilled] [Clear to Auscultation Bilat] : clear to auscultation bilaterally [Normal Sinus Rhythm] : normal sinus rhythm [Normal S1, S2] : normal S1 and S2 [No Gallops] : no gallops [No Rubs] : no pericardial rub [EOMI] : extra ocular movement intact [PERRL] : pupils equal, round and reactive to light [Sclera nonicteric] : sclera nonicteric [No Thyromegaly] : no thyromegaly [Asymmetrical] : asymmetrical [de-identified] : Left breast is larger than the right. Left lower outer ecchymosis. Left 3:00 (N + 9.5 cm) breast core biopsy scar. Left 3:30 (N + 6.5 cm) subtle, palpable mass 2.5 x 1.8 cm, consistent with site of recently biopsied tumor.

## 2022-09-06 NOTE — HISTORY OF PRESENT ILLNESS
[FreeTextEntry1] : Patient is a 62 year old female with recent diagnosis of moderately differentiated invasive ductal carcinoma of her left breast, found on routine annual imaging. \par She is referred to me today by her OBGYN, Dr. Joby Bellamy. \par She has a history of thyroid cancer (dx in 2009), treated with total thyroidectomy. \par Family history of breast cancer in her paternal grandmother (dx ~ 87 years old) and paternal cousin (dx ~40 years old). \par Family history of pancreatic cancer in her maternal uncle, diagnosed at age 70. \par 2 of the patient's sisters have had genetic testing, both BRCA -. \par [facility]%\par A 1.1 cm dense nodular density is noted in the left upper outer quadrant posterior depth for which ultrasound examination is to follow.\par Stable focal area of asymmetric density is seen in the right central breast middle depth. Stable grouping of calcifications is noted in the left upper inner quadrant anterior depth. There is no evidence for skin thickening or nipple retraction.\par Bilateral breast ultrasound: Right breast- at the 11-12:00 position 2 to 3 cm from the nipple a stable 7 mm cyst is noted. Left breast- at the 2:00 position 8 cm from the nipple a 1 cm slightly lobulated hypoechoic lesion is seen. This corresponds to the mammographic finding in this region. Ultrasound-guided core biopsy is recommended. Bi-rads 4B. \par 8/26/2022 US guided left breast 3:00 (8cmFN) core biopsy: A ribbon shaped marking clip was placed. 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 Kate score 6/9 (3+1+1) in this limited material. Invasive tumor measures at least 9 mm. DCIS not present. These results are concordant. Surgical/oncologic management is recommended and consideration to presurgical MRI. ER+/GA+/Her-2 negative. \par She is here today with her sister, Tanja Loyola.

## 2022-09-06 NOTE — CONSULT LETTER
[Dear  ___] : Dear  [unfilled], [Consult Letter:] : I had the pleasure of evaluating your patient, [unfilled]. [Please see my note below.] : Please see my note below. [Consult Closing:] : Thank you very much for allowing me to participate in the care of this patient.  If you have any questions, please do not hesitate to contact me. [Sincerely,] : Sincerely, [DrAna  ___] : Dr. MIRANDA [FreeTextEntry3] : Susan M. Palleschi, MD, FACS\par Division of Breast Surgery\par Director, Breast Surgery\par [facility]\par [address] [place] [address]\par [address]\[place] [address]\par (Phone) [phone]\par (Fax) [phone]

## 2022-09-06 NOTE — ASSESSMENT
[FreeTextEntry1] : left breast invasive ductal carcinoma\par Clinically stage 1\par \par 1. The surgical treatment options of left Osiris  localization wide excision lumpectomy, left axillary sentinel lymph node (LN) biopsy, possible left axillary LN dissection followed by post-operative XRT (to reduce risk of local recurrence), vs. left total mastectomy, left axillary sentinel LN biopsy, possible left axillary LN dissection, vs. bilateral total mastectomies, left axillary sentinel LN biopsies, if she should so choose, were discussed in detail. The indications, alternatives, benefits and risks were discussed, including but not limited to bleeding, infection, pain, scar, swelling, numbness, change in breast appearance, recurrence, potential need for additional surgery and lymphedema. The breast cancer booklet and postoperative instructions were reviewed and provided to the patient. \par 2. The lymphedema instruction sheet was reviewed and provided.\par 3. I discussed with her the potential for postoperative radiation therapy.\par 4. I discussed with her the potential for postoperative adjuvant therapy, including the possibility of chemotherapy and/or anti-hormonal therapy as indicated. \par 5. Slide review: to be requested if patient opts to proceed.\par 6. Bilateral breast MRI: advise preop to rule out additional areas of disease. Rx provided, Curt\par 7. Genetic testing: Discussed with her in great detail; advise genetic testing; drawn today.\par 8. Reconstruction: discussed with her the option of reconstruction if she undergoes mastectomy(ies).\par 9. CXR: Rx provided, to be done preop as screening for distant metastatic cancer.\par 10. Complete Metabolic panel (CMP): drawn today, to evaluate for abnormalities suggestive of distant metastatic disease. \par 11. Discussed possible oncoplastic reconstruction to achieve optimal cosmetic result and to address her asymmetry. \par 12. Discussed potential for nipple areolar sparing approach pending evaluation of the plastic surgeon. \par 13. I will contact her with the results of the genetic testing, CMP, CXR and MRI and provide further recommendations at that time.\par \par This patient encounter took 80 minutes today to perform records review, chart preparation, clinical encounter, coordination of care and documentation.

## 2022-09-07 ENCOUNTER — NON-APPOINTMENT (OUTPATIENT)
Age: 62
End: 2022-09-07

## 2022-09-10 ENCOUNTER — APPOINTMENT (OUTPATIENT)
Dept: RADIOLOGY | Facility: CLINIC | Age: 62
End: 2022-09-10

## 2022-09-10 ENCOUNTER — OUTPATIENT (OUTPATIENT)
Dept: OUTPATIENT SERVICES | Facility: HOSPITAL | Age: 62
LOS: 1 days | End: 2022-09-10
Payer: COMMERCIAL

## 2022-09-10 ENCOUNTER — APPOINTMENT (OUTPATIENT)
Dept: MRI IMAGING | Facility: CLINIC | Age: 62
End: 2022-09-10

## 2022-09-10 DIAGNOSIS — Z00.8 ENCOUNTER FOR OTHER GENERAL EXAMINATION: ICD-10-CM

## 2022-09-10 DIAGNOSIS — C50.919 MALIGNANT NEOPLASM OF UNSPECIFIED SITE OF UNSPECIFIED FEMALE BREAST: ICD-10-CM

## 2022-09-10 DIAGNOSIS — E89.0 POSTPROCEDURAL HYPOTHYROIDISM: Chronic | ICD-10-CM

## 2022-09-10 PROCEDURE — 77049 MRI BREAST C-+ W/CAD BI: CPT | Mod: 26

## 2022-09-10 PROCEDURE — C8937: CPT

## 2022-09-10 PROCEDURE — 71045 X-RAY EXAM CHEST 1 VIEW: CPT | Mod: 26

## 2022-09-10 PROCEDURE — A9585: CPT

## 2022-09-10 PROCEDURE — 71045 X-RAY EXAM CHEST 1 VIEW: CPT

## 2022-09-10 PROCEDURE — C8908: CPT

## 2022-09-14 ENCOUNTER — NON-APPOINTMENT (OUTPATIENT)
Age: 62
End: 2022-09-14

## 2022-09-28 ENCOUNTER — NON-APPOINTMENT (OUTPATIENT)
Age: 62
End: 2022-09-28

## 2022-09-29 ENCOUNTER — APPOINTMENT (OUTPATIENT)
Dept: PLASTIC SURGERY | Facility: CLINIC | Age: 62
End: 2022-09-29

## 2022-09-29 VITALS
WEIGHT: 175 LBS | HEIGHT: 66 IN | BODY MASS INDEX: 28.12 KG/M2 | DIASTOLIC BLOOD PRESSURE: 75 MMHG | HEART RATE: 71 BPM | SYSTOLIC BLOOD PRESSURE: 163 MMHG | OXYGEN SATURATION: 97 % | TEMPERATURE: 97.1 F

## 2022-09-29 PROCEDURE — 99205 OFFICE O/P NEW HI 60 MIN: CPT

## 2022-10-05 ENCOUNTER — OUTPATIENT (OUTPATIENT)
Dept: OUTPATIENT SERVICES | Facility: HOSPITAL | Age: 62
LOS: 1 days | End: 2022-10-05
Payer: COMMERCIAL

## 2022-10-05 VITALS
HEIGHT: 64.5 IN | OXYGEN SATURATION: 97 % | HEART RATE: 76 BPM | WEIGHT: 188.94 LBS | TEMPERATURE: 97 F | RESPIRATION RATE: 17 BRPM | SYSTOLIC BLOOD PRESSURE: 155 MMHG | DIASTOLIC BLOOD PRESSURE: 92 MMHG
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
## 2022-10-05 DIAGNOSIS — Z90.12 ACQUIRED ABSENCE OF LEFT BREAST AND NIPPLE: ICD-10-CM

## 2022-10-05 DIAGNOSIS — I10 ESSENTIAL (PRIMARY) HYPERTENSION: ICD-10-CM

## 2022-10-05 DIAGNOSIS — E89.0 POSTPROCEDURAL HYPOTHYROIDISM: Chronic | ICD-10-CM

## 2022-10-05 DIAGNOSIS — F41.9 ANXIETY DISORDER, UNSPECIFIED: ICD-10-CM

## 2022-10-05 DIAGNOSIS — Z80.3 FAMILY HISTORY OF MALIGNANT NEOPLASM OF BREAST: ICD-10-CM

## 2022-10-05 DIAGNOSIS — Z01.818 ENCOUNTER FOR OTHER PREPROCEDURAL EXAMINATION: ICD-10-CM

## 2022-10-05 DIAGNOSIS — E03.9 HYPOTHYROIDISM, UNSPECIFIED: ICD-10-CM

## 2022-10-05 DIAGNOSIS — C50.912 MALIGNANT NEOPLASM OF UNSPECIFIED SITE OF LEFT FEMALE BREAST: ICD-10-CM

## 2022-10-05 DIAGNOSIS — S21.002A UNSPECIFIED OPEN WOUND OF LEFT BREAST, INITIAL ENCOUNTER: ICD-10-CM

## 2022-10-05 DIAGNOSIS — E78.5 HYPERLIPIDEMIA, UNSPECIFIED: ICD-10-CM
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
## 2022-10-05 LAB
ALBUMIN SERPL ELPH-MCNC: 4.2 G/DL — SIGNIFICANT CHANGE UP (ref 3.3–5)
ALP SERPL-CCNC: 52 U/L — SIGNIFICANT CHANGE UP (ref 40–120)
ALT FLD-CCNC: 18 U/L — SIGNIFICANT CHANGE UP (ref 10–45)
ANION GAP SERPL CALC-SCNC: 10 MMOL/L — SIGNIFICANT CHANGE UP (ref 5–17)
AST SERPL-CCNC: 37 U/L — SIGNIFICANT CHANGE UP (ref 10–40)
BILIRUB SERPL-MCNC: 0.6 MG/DL — SIGNIFICANT CHANGE UP (ref 0.2–1.2)
BUN SERPL-MCNC: 22 MG/DL — SIGNIFICANT CHANGE UP (ref 7–23)
CALCIUM SERPL-MCNC: 8.9 MG/DL — SIGNIFICANT CHANGE UP (ref 8.4–10.5)
CHLORIDE SERPL-SCNC: 104 MMOL/L — SIGNIFICANT CHANGE UP (ref 96–108)
CO2 SERPL-SCNC: 29 MMOL/L — SIGNIFICANT CHANGE UP (ref 22–31)
CREAT SERPL-MCNC: 0.97 MG/DL — SIGNIFICANT CHANGE UP (ref 0.5–1.3)
EGFR: 66 ML/MIN/1.73M2 — SIGNIFICANT CHANGE UP
GLUCOSE SERPL-MCNC: 96 MG/DL — SIGNIFICANT CHANGE UP (ref 70–99)
HCT VFR BLD CALC: 41 % — SIGNIFICANT CHANGE UP (ref 34.5–45)
HGB BLD-MCNC: 13.7 G/DL — SIGNIFICANT CHANGE UP (ref 11.5–15.5)
MCHC RBC-ENTMCNC: 33 PG — SIGNIFICANT CHANGE UP (ref 27–34)
MCHC RBC-ENTMCNC: 33.4 GM/DL — SIGNIFICANT CHANGE UP (ref 32–36)
MCV RBC AUTO: 98.8 FL — SIGNIFICANT CHANGE UP (ref 80–100)
NRBC # BLD: 0 /100 WBCS — SIGNIFICANT CHANGE UP (ref 0–0)
PLATELET # BLD AUTO: 165 K/UL — SIGNIFICANT CHANGE UP (ref 150–400)
POTASSIUM SERPL-MCNC: 4.4 MMOL/L — SIGNIFICANT CHANGE UP (ref 3.5–5.3)
POTASSIUM SERPL-SCNC: 4.4 MMOL/L — SIGNIFICANT CHANGE UP (ref 3.5–5.3)
PROT SERPL-MCNC: 7.7 G/DL — SIGNIFICANT CHANGE UP (ref 6–8.3)
RBC # BLD: 4.15 M/UL — SIGNIFICANT CHANGE UP (ref 3.8–5.2)
RBC # FLD: 13.2 % — SIGNIFICANT CHANGE UP (ref 10.3–14.5)
SODIUM SERPL-SCNC: 143 MMOL/L — SIGNIFICANT CHANGE UP (ref 135–145)
WBC # BLD: 5.97 K/UL — SIGNIFICANT CHANGE UP (ref 3.8–10.5)
WBC # FLD AUTO: 5.97 K/UL — SIGNIFICANT CHANGE UP (ref 3.8–10.5)

## 2022-10-05 PROCEDURE — 85027 COMPLETE CBC AUTOMATED: CPT

## 2022-10-05 PROCEDURE — G0463: CPT

## 2022-10-05 PROCEDURE — 80053 COMPREHEN METABOLIC PANEL: CPT

## 2022-10-05 PROCEDURE — 36415 COLL VENOUS BLD VENIPUNCTURE: CPT

## 2022-10-05 RX ORDER — LEVOTHYROXINE SODIUM 125 MCG
1 TABLET ORAL
Qty: 0 | Refills: 0 | DISCHARGE

## 2022-10-05 RX ORDER — VENLAFAXINE HCL 75 MG
1 CAPSULE, EXT RELEASE 24 HR ORAL
Qty: 0 | Refills: 0 | DISCHARGE

## 2022-10-05 NOTE — H&P PST ADULT - PROBLEM SELECTOR PLAN 4
pt instructed to continue medications as prescribed and take effexor with a sip of water on the morning of the surgery

## 2022-10-05 NOTE — H&P PST ADULT - ATTENDING COMMENTS
The patient is a 62 year old female who was recently diagnosed with right breast invasive cancer. She presents to undergo a right ed  localization lumpectomy, left axillary sentinel lymph node biopsy, possible left axillary lymph node dissection and bilateral oncoplastic lift.

## 2022-10-05 NOTE — H&P PST ADULT - VISION (WITH CORRECTIVE LENSES IF THE PATIENT USUALLY WEARS THEM):
uses contact lenses, will wear glasses on the day of surgery/Normal vision: sees adequately in most situations; can see medication labels, newsprint

## 2022-10-05 NOTE — H&P PST ADULT - NSICDXFAMILYHX_GEN_ALL_CORE_FT
FAMILY HISTORY:  Father  Still living? No  FH: HTN (hypertension), Age at diagnosis: Age Unknown

## 2022-10-05 NOTE — H&P PST ADULT - NSICDXPASTMEDICALHX_GEN_ALL_CORE_FT
PAST MEDICAL HISTORY:  Anxiety     HLD (hyperlipidemia)     HTN (hypertension)     Hypothyroid     Malignant neoplasm of unspecified site of left female breast

## 2022-10-05 NOTE — H&P PST ADULT - PROBLEM SELECTOR PLAN 1
pt scheduled for bilateral breast oncoplastic reduction on 10/19/22  Preop instructions provided. Pt verbalized understanding.    written and verbal instructions with teach back on chlorhexidine shampoo provided  pt aware of need for COVID testing 72 hrs preop, list of locations provided   elevated BP at PST, med eval requested

## 2022-10-05 NOTE — H&P PST ADULT - NSANTHOSAYNRD_GEN_A_CORE
neck 17.5 inches/No. LYNN screening performed.  STOP BANG Legend: 0-2 = LOW Risk; 3-4 = INTERMEDIATE Risk; 5-8 = HIGH Risk

## 2022-10-05 NOTE — H&P PST ADULT - HISTORY OF PRESENT ILLNESS
62 y.o. female reports h/o abnormal mammogram in 08/2022, s/p breast ultrasound and biopsy, followed by breast MRI in 09/2022, presents to PST with preop diagnosis of malignant neoplasm of unspecified site of left female breast, scheduled for bilateral breast oncoplastic reduction, denies breast tenderness, nipple discharge, weight loss, fatigue 62 y.o. female reports h/o abnormal routine mammogram in 08/2022, s/p breast ultrasound and biopsy, followed by breast MRI in 09/2022, presents to PST with preop diagnosis of malignant neoplasm of unspecified site of left female breast, scheduled for bilateral breast oncoplastic reduction, denies breast tenderness, nipple discharge, weight loss, fatigue

## 2022-10-05 NOTE — H&P PST ADULT - ENDOCRINE COMMENTS
----- Message from Leonidas Tang sent at 11/20/2019 11:06 AM CST -----  Contact: Self, [phone]  The patient called stating she has been having the flu for 3 days, and is requesting a prescription of antibiotics to be called into the [facility] pharmacy in [place] [phone]. Please assist and thanks in advance.     
Notified patient  
Please see message below.      
h/o thyroid nodule, s/p thyroidectomy

## 2022-10-05 NOTE — H&P PST ADULT - 
ADDITIONAL INFORMATION
pt does not have a copy of vaccine card at time of PST unable to verify dates, pt does not have a copy of vaccine card at time of PST

## 2022-10-11 PROBLEM — I10 ESSENTIAL (PRIMARY) HYPERTENSION: Chronic | Status: ACTIVE | Noted: 2022-10-05

## 2022-10-11 PROBLEM — E78.5 HYPERLIPIDEMIA, UNSPECIFIED: Chronic | Status: ACTIVE | Noted: 2022-10-05

## 2022-10-11 PROBLEM — F41.9 ANXIETY DISORDER, UNSPECIFIED: Chronic | Status: ACTIVE | Noted: 2022-10-05

## 2022-10-11 PROBLEM — C50.912 MALIGNANT NEOPLASM OF UNSPECIFIED SITE OF LEFT FEMALE BREAST: Chronic | Status: ACTIVE | Noted: 2022-10-05

## 2022-10-17 ENCOUNTER — OUTPATIENT (OUTPATIENT)
Dept: OUTPATIENT SERVICES | Facility: HOSPITAL | Age: 62
LOS: 1 days | End: 2022-10-17
Payer: COMMERCIAL

## 2022-10-17 ENCOUNTER — APPOINTMENT (OUTPATIENT)
Dept: CARDIOLOGY | Facility: CLINIC | Age: 62
End: 2022-10-17

## 2022-10-17 ENCOUNTER — APPOINTMENT (OUTPATIENT)
Dept: ULTRASOUND IMAGING | Facility: IMAGING CENTER | Age: 62
End: 2022-10-17

## 2022-10-17 DIAGNOSIS — Z00.8 ENCOUNTER FOR OTHER GENERAL EXAMINATION: ICD-10-CM

## 2022-10-17 DIAGNOSIS — E89.0 POSTPROCEDURAL HYPOTHYROIDISM: Chronic | ICD-10-CM

## 2022-10-17 DIAGNOSIS — C50.919 MALIGNANT NEOPLASM OF UNSPECIFIED SITE OF UNSPECIFIED FEMALE BREAST: ICD-10-CM

## 2022-10-17 PROCEDURE — 19285 PERQ DEV BREAST 1ST US IMAG: CPT

## 2022-10-17 PROCEDURE — 19285 PERQ DEV BREAST 1ST US IMAG: CPT | Mod: LT

## 2022-10-17 PROCEDURE — C1739: CPT

## 2022-10-19 ENCOUNTER — APPOINTMENT (OUTPATIENT)
Dept: PLASTIC SURGERY | Facility: HOSPITAL | Age: 62
End: 2022-10-19

## 2022-10-20 ENCOUNTER — APPOINTMENT (OUTPATIENT)
Dept: CARDIOLOGY | Facility: CLINIC | Age: 62
End: 2022-10-20

## 2022-10-21 ENCOUNTER — OUTPATIENT (OUTPATIENT)
Dept: OUTPATIENT SERVICES | Facility: HOSPITAL | Age: 62
LOS: 1 days | End: 2022-10-21
Payer: COMMERCIAL

## 2022-10-21 DIAGNOSIS — E89.0 POSTPROCEDURAL HYPOTHYROIDISM: Chronic | ICD-10-CM

## 2022-10-21 DIAGNOSIS — Z20.828 CONTACT WITH AND (SUSPECTED) EXPOSURE TO OTHER VIRAL COMMUNICABLE DISEASES: ICD-10-CM

## 2022-10-21 PROCEDURE — U0003: CPT

## 2022-10-21 PROCEDURE — U0005: CPT

## 2022-10-22 LAB — SARS-COV-2 RNA SPEC QL NAA+PROBE: DETECTED

## 2022-10-24 ENCOUNTER — APPOINTMENT (OUTPATIENT)
Dept: CARDIOLOGY | Facility: CLINIC | Age: 62
End: 2022-10-24

## 2022-10-24 ENCOUNTER — NON-APPOINTMENT (OUTPATIENT)
Age: 62
End: 2022-10-24

## 2022-10-24 VITALS
RESPIRATION RATE: 19 BRPM | HEIGHT: 66 IN | DIASTOLIC BLOOD PRESSURE: 100 MMHG | OXYGEN SATURATION: 98 % | BODY MASS INDEX: 28.28 KG/M2 | TEMPERATURE: 98.2 F | HEART RATE: 73 BPM | SYSTOLIC BLOOD PRESSURE: 159 MMHG | WEIGHT: 176 LBS

## 2022-10-24 DIAGNOSIS — R94.31 ABNORMAL ELECTROCARDIOGRAM [ECG] [EKG]: ICD-10-CM

## 2022-10-24 DIAGNOSIS — I10 ESSENTIAL (PRIMARY) HYPERTENSION: ICD-10-CM

## 2022-10-24 DIAGNOSIS — R06.09 OTHER FORMS OF DYSPNEA: ICD-10-CM

## 2022-10-24 PROCEDURE — 93000 ELECTROCARDIOGRAM COMPLETE: CPT

## 2022-10-24 PROCEDURE — 99215 OFFICE O/P EST HI 40 MIN: CPT | Mod: 25

## 2022-10-24 NOTE — PHYSICAL EXAM
[General Appearance - Well Developed] : well developed [Normal Appearance] : normal appearance [Well Groomed] : well groomed [General Appearance - Well Nourished] : well nourished [No Deformities] : no deformities [General Appearance - In No Acute Distress] : no acute distress [Normal Conjunctiva] : the conjunctiva exhibited no abnormalities [Normal Oral Mucosa] : normal oral mucosa [Eyelids - No Xanthelasma] : the eyelids demonstrated no xanthelasmas [No Oral Pallor] : no oral pallor [No Oral Cyanosis] : no oral cyanosis [Normal Jugular Venous A Waves Present] : normal jugular venous A waves present [Normal Jugular Venous V Waves Present] : normal jugular venous V waves present [No Jugular Venous Stokes A Waves] : no jugular venous stokes A waves [Respiration, Rhythm And Depth] : normal respiratory rhythm and effort [Exaggerated Use Of Accessory Muscles For Inspiration] : no accessory muscle use [Auscultation Breath Sounds / Voice Sounds] : lungs were clear to auscultation bilaterally [Abdomen Soft] : soft [Abdomen Tenderness] : non-tender [Abdomen Mass (___ Cm)] : no abdominal mass palpated [Abnormal Walk] : normal gait [Gait - Sufficient For Exercise Testing] : the gait was sufficient for exercise testing [Nail Clubbing] : no clubbing of the fingernails [Cyanosis, Localized] : no localized cyanosis [Petechial Hemorrhages (___cm)] : no petechial hemorrhages [Skin Color & Pigmentation] : normal skin color and pigmentation [] : no rash [No Venous Stasis] : no venous stasis [Skin Lesions] : no skin lesions [No Skin Ulcers] : no skin ulcer [No Xanthoma] : no  xanthoma was observed [Affect] : the affect was normal [Oriented To Time, Place, And Person] : oriented to person, place, and time [Mood] : the mood was normal [No Anxiety] : not feeling anxious [Normal Rate] : normal [Normal S1] : normal S1 [Normal S2] : normal S2 [No Murmur] : no murmurs heard [2+] : left 2+ [No Abnormalities] : the abdominal aorta was not enlarged and no bruit was heard [No Pitting Edema] : no pitting edema present [S3] : no S3 [S4] : no S4 [Right Carotid Bruit] : no bruit heard over the right carotid [Left Carotid Bruit] : no bruit heard over the left carotid [Right Femoral Bruit] : no bruit heard over the right femoral artery [Left Femoral Bruit] : no bruit heard over the left femoral artery

## 2022-10-24 NOTE — HISTORY OF PRESENT ILLNESS
[Preoperative Visit] : for a medical evaluation prior to surgery [Scheduled Procedure ___] : a [unfilled] [Date of Surgery ___] : on [unfilled] [Surgeon Name ___] : surgeon: [unfilled] [Good] : Good [Cardiovascular Disease] : cardiovascular disease [Alcohol Use] : alcohol use [Prior Anesthesia] : Prior anesthesia [Electrocardiogram] : ~T an ECG ~C was performed [Echocardiogram] : ~T an echocardiogram ~C was performed [Cardiovascular Stress Test] : a cardiac stress test ~T ~C was performed [Fever] : no fever [Chills] : no chills [Fatigue] : no fatigue [Chest Pain] : no chest pain [Cough] : no cough [Dyspnea] : no dyspnea [Dysuria] : no dysuria [Urinary Frequency] : no urinary frequency [Nausea] : no nausea [Vomiting] : no vomiting [Diarrhea] : no diarrhea [Abdominal Pain] : no abdominal pain [Easy Bruising] : no easy bruising [Lower Extremity Swelling] : no lower extremity swelling [Poor Exercise Tolerance] : no poor exercise tolerance [Diabetes] : no diabetes [Pulmonary Disease] : no pulmonary disease [Anti-Platelet Agents] : no anti-platelet agents [Nicotine Dependence] : no nicotine dependence [Renal Disease] : no renal disease [GI Disease] : no gastrointestinal disease [Sleep Apnea] : no sleep apnea [Thromboembolic Problems] : no thromboembolic problems [Frequent use of NSAIDs] : no use of NSAIDs [Transfusion Reaction] : no transfusion reaction [Impaired Immunity] : no impaired immunity [Steroid Use in Last 6 Months] : no steroid use in the last six months [Frequent Aspirin Use] : no frequent aspirin use [Prev Anesthesia Reaction] : no previous anesthesia reaction [Anesthesia Reaction] : no anesthesia reaction [Sudden Death] : no sudden death [Clotting Disorder] : no clotting disorder [Bleeding Disorder] : no bleeding disorder

## 2022-10-24 NOTE — DISCUSSION/SUMMARY
[Procedure Intermediate Risk] : the procedure risk is intermediate [Patient Intermediate Risk] : the patient is an intermediate risk [Optimized for Surgery] : the patient is optimized for surgery [As per surgery] : as per surgery [Continue] : Continue medications as currently directed [FreeTextEntry1] : Echo to be preformed pre-op, cardiac monitor intra-op [FreeTextEntry3] : inc irbesarten/hctz to bid

## 2022-10-27 ENCOUNTER — APPOINTMENT (OUTPATIENT)
Dept: CARDIOLOGY | Facility: CLINIC | Age: 62
End: 2022-10-27

## 2022-10-27 PROCEDURE — 93306 TTE W/DOPPLER COMPLETE: CPT

## 2022-10-31 ENCOUNTER — RESULT REVIEW (OUTPATIENT)
Age: 62
End: 2022-10-31

## 2022-10-31 ENCOUNTER — APPOINTMENT (OUTPATIENT)
Dept: PLASTIC SURGERY | Facility: CLINIC | Age: 62
End: 2022-10-31
Payer: COMMERCIAL

## 2022-10-31 ENCOUNTER — TRANSCRIPTION ENCOUNTER (OUTPATIENT)
Age: 62
End: 2022-10-31

## 2022-10-31 ENCOUNTER — NON-APPOINTMENT (OUTPATIENT)
Age: 62
End: 2022-10-31

## 2022-10-31 ENCOUNTER — OUTPATIENT (OUTPATIENT)
Dept: OUTPATIENT SERVICES | Facility: HOSPITAL | Age: 62
LOS: 1 days | End: 2022-10-31

## 2022-10-31 VITALS
OXYGEN SATURATION: 98 % | BODY MASS INDEX: 28.28 KG/M2 | DIASTOLIC BLOOD PRESSURE: 90 MMHG | SYSTOLIC BLOOD PRESSURE: 142 MMHG | HEART RATE: 106 BPM | HEIGHT: 66 IN | WEIGHT: 176 LBS

## 2022-10-31 DIAGNOSIS — Z80.3 FAMILY HISTORY OF MALIGNANT NEOPLASM OF BREAST: ICD-10-CM

## 2022-10-31 DIAGNOSIS — E89.0 POSTPROCEDURAL HYPOTHYROIDISM: Chronic | ICD-10-CM

## 2022-10-31 DIAGNOSIS — C50.912 MALIGNANT NEOPLASM OF UNSPECIFIED SITE OF LEFT FEMALE BREAST: ICD-10-CM

## 2022-10-31 DIAGNOSIS — Z90.12 ACQUIRED ABSENCE OF LEFT BREAST AND NIPPLE: ICD-10-CM

## 2022-10-31 DIAGNOSIS — S21.002A UNSPECIFIED OPEN WOUND OF LEFT BREAST, INITIAL ENCOUNTER: ICD-10-CM

## 2022-10-31 LAB — SURGICAL PATHOLOGY STUDY: SIGNIFICANT CHANGE UP

## 2022-10-31 PROCEDURE — 99214 OFFICE O/P EST MOD 30 MIN: CPT

## 2022-11-01 ENCOUNTER — OUTPATIENT (OUTPATIENT)
Dept: OUTPATIENT SERVICES | Facility: HOSPITAL | Age: 62
LOS: 1 days | End: 2022-11-01
Payer: COMMERCIAL

## 2022-11-01 ENCOUNTER — APPOINTMENT (OUTPATIENT)
Dept: PLASTIC SURGERY | Facility: HOSPITAL | Age: 62
End: 2022-11-01

## 2022-11-01 ENCOUNTER — RESULT REVIEW (OUTPATIENT)
Age: 62
End: 2022-11-01

## 2022-11-01 ENCOUNTER — TRANSCRIPTION ENCOUNTER (OUTPATIENT)
Age: 62
End: 2022-11-01

## 2022-11-01 VITALS
DIASTOLIC BLOOD PRESSURE: 79 MMHG | HEIGHT: 64.5 IN | TEMPERATURE: 98 F | WEIGHT: 188.94 LBS | HEART RATE: 93 BPM | OXYGEN SATURATION: 99 % | SYSTOLIC BLOOD PRESSURE: 113 MMHG | RESPIRATION RATE: 16 BRPM

## 2022-11-01 VITALS
DIASTOLIC BLOOD PRESSURE: 82 MMHG | TEMPERATURE: 97 F | SYSTOLIC BLOOD PRESSURE: 152 MMHG | OXYGEN SATURATION: 95 % | RESPIRATION RATE: 14 BRPM | HEART RATE: 97 BPM

## 2022-11-01 DIAGNOSIS — E89.0 POSTPROCEDURAL HYPOTHYROIDISM: Chronic | ICD-10-CM

## 2022-11-01 DIAGNOSIS — S21.002A UNSPECIFIED OPEN WOUND OF LEFT BREAST, INITIAL ENCOUNTER: ICD-10-CM

## 2022-11-01 DIAGNOSIS — C50.912 MALIGNANT NEOPLASM OF UNSPECIFIED SITE OF LEFT FEMALE BREAST: ICD-10-CM

## 2022-11-01 DIAGNOSIS — Z80.3 FAMILY HISTORY OF MALIGNANT NEOPLASM OF BREAST: ICD-10-CM

## 2022-11-01 DIAGNOSIS — Z90.12 ACQUIRED ABSENCE OF LEFT BREAST AND NIPPLE: ICD-10-CM

## 2022-11-01 PROCEDURE — A9541: CPT

## 2022-11-01 PROCEDURE — 38792 RA TRACER ID OF SENTINL NODE: CPT | Mod: LT

## 2022-11-01 PROCEDURE — 88304 TISSUE EXAM BY PATHOLOGIST: CPT

## 2022-11-01 PROCEDURE — 19301 PARTIAL MASTECTOMY: CPT | Mod: LT

## 2022-11-01 PROCEDURE — 88304 TISSUE EXAM BY PATHOLOGIST: CPT | Mod: 26

## 2022-11-01 PROCEDURE — 38792 RA TRACER ID OF SENTINL NODE: CPT

## 2022-11-01 PROCEDURE — 38525 BIOPSY/REMOVAL LYMPH NODES: CPT

## 2022-11-01 PROCEDURE — 88307 TISSUE EXAM BY PATHOLOGIST: CPT

## 2022-11-01 PROCEDURE — 88307 TISSUE EXAM BY PATHOLOGIST: CPT | Mod: 26

## 2022-11-01 PROCEDURE — 38525 BIOPSY/REMOVAL LYMPH NODES: CPT | Mod: LT

## 2022-11-01 PROCEDURE — 19318 BREAST REDUCTION: CPT | Mod: 50

## 2022-11-01 PROCEDURE — 19301 PARTIAL MASTECTOMY: CPT

## 2022-11-01 PROCEDURE — 19318 BREAST REDUCTION: CPT | Mod: 50,XS

## 2022-11-01 PROCEDURE — 76098 X-RAY EXAM SURGICAL SPECIMEN: CPT

## 2022-11-01 PROCEDURE — 76098 X-RAY EXAM SURGICAL SPECIMEN: CPT | Mod: 26

## 2022-11-01 RX ORDER — SODIUM CHLORIDE 9 MG/ML
1000 INJECTION, SOLUTION INTRAVENOUS
Refills: 0 | Status: DISCONTINUED | OUTPATIENT
Start: 2022-11-01 | End: 2022-11-01

## 2022-11-01 RX ORDER — ACETAMINOPHEN 500 MG
2 TABLET ORAL
Qty: 0 | Refills: 0 | DISCHARGE

## 2022-11-01 RX ORDER — DIPHENHYDRAMINE HCL 50 MG
25 CAPSULE ORAL
Qty: 0 | Refills: 0 | DISCHARGE

## 2022-11-01 RX ORDER — ONDANSETRON 8 MG/1
4 TABLET, FILM COATED ORAL ONCE
Refills: 0 | Status: DISCONTINUED | OUTPATIENT
Start: 2022-11-01 | End: 2022-11-01

## 2022-11-01 RX ORDER — IRBESARTAN 75 MG/1
1 TABLET ORAL
Qty: 0 | Refills: 0 | DISCHARGE

## 2022-11-01 RX ORDER — HYDROMORPHONE HYDROCHLORIDE 2 MG/ML
0.5 INJECTION INTRAMUSCULAR; INTRAVENOUS; SUBCUTANEOUS
Refills: 0 | Status: DISCONTINUED | OUTPATIENT
Start: 2022-11-01 | End: 2022-11-01

## 2022-11-01 RX ORDER — ROSUVASTATIN CALCIUM 5 MG/1
1 TABLET ORAL
Qty: 0 | Refills: 0 | DISCHARGE

## 2022-11-01 RX ORDER — HYDROMORPHONE HYDROCHLORIDE 2 MG/ML
1 INJECTION INTRAMUSCULAR; INTRAVENOUS; SUBCUTANEOUS
Refills: 0 | Status: DISCONTINUED | OUTPATIENT
Start: 2022-11-01 | End: 2022-11-01

## 2022-11-01 RX ORDER — LEVOTHYROXINE SODIUM 125 MCG
1 TABLET ORAL
Qty: 0 | Refills: 0 | DISCHARGE

## 2022-11-01 RX ORDER — VENLAFAXINE HCL 75 MG
150 CAPSULE, EXT RELEASE 24 HR ORAL
Qty: 0 | Refills: 0 | DISCHARGE

## 2022-11-01 RX ADMIN — SODIUM CHLORIDE 50 MILLILITER(S): 9 INJECTION, SOLUTION INTRAVENOUS at 13:17

## 2022-11-01 NOTE — ASU DISCHARGE PLAN (ADULT/PEDIATRIC) - MEDICATION INSTRUCTIONS
Pain medication (Percocet) was sent to your pharmacy ([facility]) from Dr. Sheppard's office.

## 2022-11-01 NOTE — ASU DISCHARGE PLAN (ADULT/PEDIATRIC) - COMMENTS
Follow-up with Dr. Sheppard at [address] [place] [address]. [address] [place] on 11/7/22.

## 2022-11-01 NOTE — ASU DISCHARGE PLAN (ADULT/PEDIATRIC) - SHOWER ONLY DURATION DAY(S)
You may shower 48 hours after surgery.  Remove the bra to shower. Continue to wear a bra after showering.

## 2022-11-01 NOTE — BRIEF OPERATIVE NOTE - NSICDXBRIEFPROCEDURE_GEN_ALL_CORE_FT
PROCEDURES:  Lumpectomy of left breast with sentinel node biopsy 01-Nov-2022 16:56:00  Palleschi, Susan M  
PROCEDURES:  Oncoplastic breast reduction 01-Nov-2022 17:58:31  Richelle Berrios

## 2022-11-01 NOTE — PRE-ANESTHESIA EVALUATION ADULT - NSANTHADDINFOFT_GEN_ALL_CORE
Discussed GA with ETT in detail with patient and all questions sought and answered. Pt. agrees to all plans and wishes to proceed with surgical care.    Medical/cardiac evaluation/optimization and clearance noted and appreciated.

## 2022-11-01 NOTE — BRIEF OPERATIVE NOTE - OPERATION/FINDINGS
Bilateral oncoplastic breast reconstruction following left breast lumpectomy.
specimen radiograph revealed clip, ed and mass

## 2022-11-01 NOTE — BRIEF OPERATIVE NOTE - SPECIMENS
additional left and right breast tissue/skin
left axillary sentinel lymph nodes x 2, left 3:00 lumpectomy

## 2022-11-01 NOTE — BRIEF OPERATIVE NOTE - NSICDXBRIEFPOSTOP_GEN_ALL_CORE_FT
POST-OP DIAGNOSIS:  Cancer of midline of left female breast 01-Nov-2022 16:56:28  Palleschi, Susan M  
POST-OP DIAGNOSIS:  Cancer of midline of left female breast 01-Nov-2022 16:56:28  Palleschi, Susan M

## 2022-11-01 NOTE — PRE-ANESTHESIA EVALUATION ADULT - NSPROPOSEDPROCEDFT_GEN_ALL_CORE
Left breast lumpectomy with left axillary sentinel lymph node biopsy and bilateral breast oncoplastic reduction

## 2022-11-01 NOTE — BRIEF OPERATIVE NOTE - NSICDXBRIEFPREOP_GEN_ALL_CORE_FT
PRE-OP DIAGNOSIS:  Cancer of midline of left female breast 01-Nov-2022 16:56:18  Palleschi, Susan M  
PRE-OP DIAGNOSIS:  Cancer of midline of left female breast 01-Nov-2022 16:56:18  Palleschi, Susan M

## 2022-11-01 NOTE — ASU DISCHARGE PLAN (ADULT/PEDIATRIC) - NS MD DC FALL RISK RISK
For information on Fall & Injury Prevention, visit: [email or web address] OR  [email or web address] OR  https://www.cdc.gov/steadi/patient.html

## 2022-11-01 NOTE — ASU DISCHARGE PLAN (ADULT/PEDIATRIC) - CARE PROVIDER_API CALL
Johnnie Sheppard; MONIK)  Otolaryngology; Plastic Surgery  [address] [place] [address] [address]  [place] [address]  Phone: [phone]  Fax: [phone]  Follow Up Time:

## 2022-11-01 NOTE — ASU PATIENT PROFILE, ADULT - FALL HARM RISK - UNIVERSAL INTERVENTIONS
Bed in lowest position, wheels locked, appropriate side rails in place/Call bell, personal items and telephone in reach/Instruct patient to call for assistance before getting out of bed or chair/Non-slip footwear when patient is out of bed/[facility] to call system/Physically safe environment - no spills, clutter or unnecessary equipment/Purposeful Proactive Rounding/Room/bathroom lighting operational, light cord in reach

## 2022-11-07 ENCOUNTER — APPOINTMENT (OUTPATIENT)
Dept: PLASTIC SURGERY | Facility: CLINIC | Age: 62
End: 2022-11-07

## 2022-11-07 VITALS
OXYGEN SATURATION: 99 % | TEMPERATURE: 98 F | HEIGHT: 66 IN | WEIGHT: 176 LBS | BODY MASS INDEX: 28.28 KG/M2 | HEART RATE: 100 BPM | SYSTOLIC BLOOD PRESSURE: 146 MMHG | DIASTOLIC BLOOD PRESSURE: 80 MMHG

## 2022-11-07 PROCEDURE — 99024 POSTOP FOLLOW-UP VISIT: CPT

## 2022-11-08 LAB — SURGICAL PATHOLOGY STUDY: SIGNIFICANT CHANGE UP

## 2022-11-10 ENCOUNTER — NON-APPOINTMENT (OUTPATIENT)
Age: 62
End: 2022-11-10

## 2022-11-11 ENCOUNTER — APPOINTMENT (OUTPATIENT)
Dept: PLASTIC SURGERY | Facility: CLINIC | Age: 62
End: 2022-11-11

## 2022-11-11 VITALS
HEIGHT: 66 IN | HEART RATE: 80 BPM | OXYGEN SATURATION: 100 % | TEMPERATURE: 97.7 F | DIASTOLIC BLOOD PRESSURE: 79 MMHG | BODY MASS INDEX: 28.28 KG/M2 | SYSTOLIC BLOOD PRESSURE: 121 MMHG | WEIGHT: 176 LBS

## 2022-11-11 PROCEDURE — 99024 POSTOP FOLLOW-UP VISIT: CPT

## 2022-11-14 ENCOUNTER — APPOINTMENT (OUTPATIENT)
Dept: PLASTIC SURGERY | Facility: CLINIC | Age: 62
End: 2022-11-14

## 2022-11-14 VITALS
HEART RATE: 75 BPM | WEIGHT: 176 LBS | DIASTOLIC BLOOD PRESSURE: 93 MMHG | SYSTOLIC BLOOD PRESSURE: 148 MMHG | HEIGHT: 66 IN | TEMPERATURE: 97.8 F | OXYGEN SATURATION: 99 % | BODY MASS INDEX: 28.28 KG/M2

## 2022-11-14 PROCEDURE — 99024 POSTOP FOLLOW-UP VISIT: CPT

## 2022-11-14 NOTE — HISTORY OF PRESENT ILLNESS
[FreeTextEntry1] : Patient is a 62 year old female here today for her first post operative visit. \par She has a history of thyroid cancer (dx in 2009), treated with total thyroidectomy. \par Family history of breast cancer in her paternal grandmother (dx ~ 87 years old) and paternal cousin (dx ~40 years old). \par Family history of pancreatic cancer in her maternal uncle, diagnosed at age 70. \par 2 of the patient's sisters have had genetic testing, both BRCA -. \par 8/15/2022 Bilateral mammogram: [facility] Lifetime Risk: 9.1%\par A 1.1 cm dense nodular density is noted in the left upper outer quadrant posterior depth for which ultrasound examination is to follow.\par Stable focal area of asymmetric density is seen in the right central breast middle depth. Stable grouping of calcifications is noted in the left upper inner quadrant anterior depth. There is no evidence for skin thickening or nipple retraction.\par Bilateral breast ultrasound: Right breast- at the 11-12:00 position 2 to 3 cm from the nipple a stable 7 mm cyst is noted. Left breast- at the 2:00 position 8 cm from the nipple a 1 cm slightly lobulated hypoechoic lesion is seen. This corresponds to the mammographic finding in this region. Ultrasound-guided core biopsy is recommended. Bi-rads 4B. \par 8/26/2022 US guided left breast 3:00 (8cmFN) core biopsy: A ribbon shaped marking clip was placed. 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 Kate score 6/9 (3+1+1) in this limited material. Invasive tumor measures at least 9 mm. DCIS not present. These results are concordant. Surgical/oncologic management is recommended and consideration to presurgical MRI. ER+/DE+/Her-2 negative. \par 09/10/22 Bilateral breast MRI:Right breast: There are scattered enhancing nonspecific foci.  There is no suspicious enhancement in the right breast.n the central slightly superior right breast seen on sequence 3-2 02 image 61 a benign well-defined T2 bright 8mm nodule is seen with benign persistent type kinetics corresponding to the mammographically stable nodule dating to 2015 . Benign corresponding finding was reported on prior ultrasounds.\par Left breast: There are scattered enhancing nonspecific foci.In the posterior outer left breast corresponding to the biopsy-proven cancer a suspicious irregular approximately 1.5 cm mass is seen in association with susceptibility artifact for which appropriate surgical and oncologic management is recommended.\par Axilla: There is no significant axillary or internal mammary lymphadenopathy.\par Surgical or oncologic managements recommended. Bi-rads 6 \par 10/31/22 Slide consult: Pathology revealed In this limited sample, the findings are most suggestive of solid\par papillary carcinoma. Frankly invasive cannot be excluded. Definitive characterization is deferred to the resection specimen. ER+/DE+/HER2-\par 11/1/22 s/p left 3:00 FAUSTINO  localization wide excision lumpectomy of invasive ductal breast cancer with left axillary sentinel lymph node biopsy and bilateral oncoplastic lift with Dr. Sheppard. \par Pathology revealed: Left axillary sentinel lymph nodes x 2, no tumor identified; Right breast tissue, excision: Skin, no tumor identified; Left breast 3:00, lumpectomy: Solid papillary carcinoma with invasion, Grade 2; Tumor size 1.7 cm (pT1c) Margins of resection, no tumor identified. Left breast 3:00, scar, core biopsy: Skin with fibrosis, no tumor identified; Left breast skin tissue, excision: Skin, no tumor identified\par Plastics: Dr. Sheppard; she saw Blank (PA) on 11/11/2022. She is on PO Cefadroxil. Her drain was removed then. She is still having some drainage. No fever

## 2022-11-14 NOTE — ASSESSMENT
[FreeTextEntry1] : Left breast invasive carcinoma, stage I.\par ER positive/AK positive/JPH6bpe negative.\par \par 1.  Pathology reviewed with Sarita, copy provided.\par 2.  Discussed with her that Oncotype DX was ordered to determine the potential need for chemotherapy.\par 3.  Referred to medical oncology Dr. Frank\par 4.  Referred to radiation oncology Dr. Alem Vallejo or Dr. Krista Giraldo\par 5.  Annual bilateral diagnostic mammogram and breast ultrasound due 8/2023\par 6. Follow up office visit due 2-3 weeks\par 7. Advise monthly self breast examinations and advised her to contact me if she has any concerns. \par 8. Left neobaseline mammogram and breast ultrasound due 3/2023

## 2022-11-14 NOTE — CONSULT LETTER
[Dear  ___] : Dear  [unfilled], [Consult Letter:] : I had the pleasure of evaluating your patient, [unfilled]. [Please see my note below.] : Please see my note below. [Consult Closing:] : Thank you very much for allowing me to participate in the care of this patient.  If you have any questions, please do not hesitate to contact me. [Sincerely,] : Sincerely, [DrnAa  ___] : Dr. MIRANDA [FreeTextEntry3] : Susan M. Palleschi, MD, FACS\par Division of Breast Surgery\par Director, Breast Surgery\par [facility]\par [address] [place] [address]\par [address]\[place] [address]\par (Phone) [phone]\par (Fax) [phone]

## 2022-11-14 NOTE — PHYSICAL EXAM
[Examined in the supine and seated position] : examined in the supine and seated position [de-identified] : S/P bilateral oncoplastic reduction, left lumpectomy, sentinel node biopsy. Right breast is perkins than left. Palpable mass left 3:00 c/w post-lumpectomy hematoma. Spontaneous leakage from left outer breast drain site. Palpation of hematoma with resultant expression of at least 10 cc of dark serous fluid with decrease in the palpable hematoma.

## 2022-11-15 ENCOUNTER — NON-APPOINTMENT (OUTPATIENT)
Age: 62
End: 2022-11-15

## 2022-11-17 ENCOUNTER — LABORATORY RESULT (OUTPATIENT)
Age: 62
End: 2022-11-17

## 2022-11-17 ENCOUNTER — APPOINTMENT (OUTPATIENT)
Dept: PLASTIC SURGERY | Facility: CLINIC | Age: 62
End: 2022-11-17

## 2022-11-17 VITALS
BODY MASS INDEX: 28.12 KG/M2 | OXYGEN SATURATION: 97 % | HEART RATE: 93 BPM | WEIGHT: 175 LBS | DIASTOLIC BLOOD PRESSURE: 83 MMHG | HEIGHT: 66 IN | SYSTOLIC BLOOD PRESSURE: 168 MMHG | TEMPERATURE: 97.8 F

## 2022-11-17 PROCEDURE — 99024 POSTOP FOLLOW-UP VISIT: CPT

## 2022-11-18 ENCOUNTER — APPOINTMENT (OUTPATIENT)
Dept: PLASTIC SURGERY | Facility: CLINIC | Age: 62
End: 2022-11-18

## 2022-11-18 VITALS
DIASTOLIC BLOOD PRESSURE: 83 MMHG | HEIGHT: 66 IN | BODY MASS INDEX: 28.12 KG/M2 | WEIGHT: 175 LBS | OXYGEN SATURATION: 97 % | TEMPERATURE: 98 F | HEART RATE: 81 BPM | SYSTOLIC BLOOD PRESSURE: 138 MMHG

## 2022-11-18 PROCEDURE — 99024 POSTOP FOLLOW-UP VISIT: CPT

## 2022-11-18 RX ORDER — CEFADROXIL 500 MG/1
500 CAPSULE ORAL TWICE DAILY
Qty: 10 | Refills: 0 | Status: DISCONTINUED | COMMUNITY
Start: 2022-11-11 | End: 2022-11-18

## 2022-11-18 NOTE — HISTORY OF PRESENT ILLNESS
[FreeTextEntry1] : Sarita is a 62 year old female who presents for a postop evaluation.  Patient is s/p bilateral oncoplastic breast reduction following left breast lumpectomy on 11/1/22.  She was seen yesterday and underwent I&D of the left breast due to increased swelling and decreased drain output.  30cc of cloudy serosanguineous drainage was evacuated from the left lateral IMF incision, and site was packed with gauze. Culture sent and patient started on Bactrim. She denies increased pain or swelling of the site.  Sarita has changed the outer gauze 3 times since yesterday.

## 2022-11-18 NOTE — PHYSICAL EXAM
[de-identified] : alert, calm, cooperative\par  [de-identified] : respirations are even and unlabored\par  [de-identified] : bilateral breast incisions are healing well.  Breasts are soft.  Left breast with moderate, generalized edema, and ecchymosis.  Packing removed from left breast wound.  Serosanguineous drainage noted on gauze packing.  No signs of fluctuance or wound dehiscence.

## 2022-11-21 ENCOUNTER — APPOINTMENT (OUTPATIENT)
Dept: PLASTIC SURGERY | Facility: CLINIC | Age: 62
End: 2022-11-21

## 2022-11-21 VITALS
OXYGEN SATURATION: 98 % | BODY MASS INDEX: 28.12 KG/M2 | DIASTOLIC BLOOD PRESSURE: 80 MMHG | HEIGHT: 66 IN | WEIGHT: 175 LBS | HEART RATE: 88 BPM | SYSTOLIC BLOOD PRESSURE: 123 MMHG | TEMPERATURE: 97.6 F

## 2022-11-21 PROCEDURE — 99024 POSTOP FOLLOW-UP VISIT: CPT

## 2022-11-21 NOTE — HISTORY OF PRESENT ILLNESS
[FreeTextEntry1] : Sarita is a 62 year old female who presents for a postop evaluation. Patient is s/p bilateral oncoplastic breast reduction following left breast lumpectomy on 11/1/22. Last week she underwent I&D of the left breast due to increased swelling and decreased drain output. 30cc of cloudy serosanguineous drainage was evacuated from the left lateral IMF incision, and site was packed with gauze. Culture revealed Serratia marcescens and patient started on Bactrim. She denies increased pain or swelling of the site. Sarita removed the packing yesterday and has noticed drainage has slowed down and swelling has reduced.

## 2022-11-21 NOTE — PHYSICAL EXAM
[de-identified] : alert, calm, cooperative\par  [de-identified] : respirations are even and unlabored\par  [de-identified] : bilateral breast incisions are healing well.  Breasts are soft.  Left breast with moderate, generalized edema, and ecchymosis, which has improved.  Mild amount of serosanguineous drainage noted on gauze dressing.  No signs of fluctuance or wound dehiscence.

## 2022-11-22 ENCOUNTER — NON-APPOINTMENT (OUTPATIENT)
Age: 62
End: 2022-11-22

## 2022-11-23 ENCOUNTER — APPOINTMENT (OUTPATIENT)
Dept: PLASTIC SURGERY | Facility: CLINIC | Age: 62
End: 2022-11-23

## 2022-11-23 VITALS
TEMPERATURE: 97.6 F | HEIGHT: 66 IN | OXYGEN SATURATION: 99 % | HEART RATE: 73 BPM | BODY MASS INDEX: 28.12 KG/M2 | WEIGHT: 175 LBS | SYSTOLIC BLOOD PRESSURE: 150 MMHG | DIASTOLIC BLOOD PRESSURE: 88 MMHG

## 2022-11-23 PROCEDURE — 99024 POSTOP FOLLOW-UP VISIT: CPT

## 2022-11-26 ENCOUNTER — OUTPATIENT (OUTPATIENT)
Dept: OUTPATIENT SERVICES | Facility: HOSPITAL | Age: 62
LOS: 1 days | Discharge: HOME | End: 2022-11-26

## 2022-11-26 DIAGNOSIS — E89.0 POSTPROCEDURAL HYPOTHYROIDISM: Chronic | ICD-10-CM

## 2022-11-26 DIAGNOSIS — Z85.3 PERSONAL HISTORY OF MALIGNANT NEOPLASM OF BREAST: ICD-10-CM

## 2022-11-29 RX ORDER — SODIUM SULFATE, POTASSIUM SULFATE, MAGNESIUM SULFATE 17.5; 3.13; 1.6 G/ML; G/ML; G/ML
17.5-3.13-1.6 SOLUTION, CONCENTRATE ORAL
Qty: 354 | Refills: 0 | Status: DISCONTINUED | COMMUNITY
Start: 2022-06-24

## 2022-11-30 ENCOUNTER — APPOINTMENT (OUTPATIENT)
Dept: HEMATOLOGY ONCOLOGY | Facility: CLINIC | Age: 62
End: 2022-11-30

## 2022-11-30 ENCOUNTER — NON-APPOINTMENT (OUTPATIENT)
Age: 62
End: 2022-11-30

## 2022-11-30 NOTE — HISTORY OF PRESENT ILLNESS
[T: ___] : T[unfilled] [N: ___] : N[unfilled] [M: ___] : M[unfilled] [de-identified] : Ms. Sarita segundo is a 62 year old female here for an evaluation of breast cancer. Her oncologic history is as follows:\par \par She underwent routine breast imaging on 08/15/2022 (BI-RADS 4B) which showed a stable 7 mm cyst sonographically in the right breast, a focal nodular density  in the left upper outer quadrant posterior depth mammographically corresponds to a 1 cm slightly lobulated hypoechoic lesion sonographically for which ultrasound-guided core biopsy is recommended.\par She underwent left breast 3 o'clock 8cmn FN core biopsy on  8/26/2022 which showed invasive moderately differentiated ductal carcinoma with focal papillary features, Kate score 6/9, measuring 9 mm, ER+ 99%, PgR+ 99%, HER-2 Negative. No ductal carcinoma in situ noted. No microcalcifications or lymphovascular permeation seen.\par These results are concordant. Surgical/oncologic management is recommended\par \par She underwent a breast MRI on 09/10/2022 (BI-RADS 6) which showed benign ovoid nodule in the central superior right breast corresponding to stable benign ovoid nodule and a biopsy-proven cancer measuring approximately 1.5 cm in the posterior outer left breast.\par \par She underwent left breast lumpectomy on 11/1/2022 which revealed solid papillary carcinoma with invasion, Kate grade 2, 6/9 measuring 1.7 cm. Margin were negative. Lymphovascular invasion could not be excluded.  No DCIS was noted. Two sentinel lymph node were removed and were negative for metastasis.\par \par Right breast tissue, excision:skin, no tumor identified\par \par Oncotype DX  0\par

## 2022-12-01 ENCOUNTER — APPOINTMENT (OUTPATIENT)
Dept: PLASTIC SURGERY | Facility: CLINIC | Age: 62
End: 2022-12-01

## 2022-12-01 VITALS
WEIGHT: 175 LBS | SYSTOLIC BLOOD PRESSURE: 149 MMHG | HEIGHT: 66 IN | BODY MASS INDEX: 28.12 KG/M2 | HEART RATE: 78 BPM | OXYGEN SATURATION: 99 % | DIASTOLIC BLOOD PRESSURE: 84 MMHG | TEMPERATURE: 96.7 F

## 2022-12-01 PROCEDURE — 99024 POSTOP FOLLOW-UP VISIT: CPT

## 2022-12-02 ENCOUNTER — APPOINTMENT (OUTPATIENT)
Dept: RADIATION ONCOLOGY | Facility: CLINIC | Age: 62
End: 2022-12-02

## 2022-12-02 ENCOUNTER — NON-APPOINTMENT (OUTPATIENT)
Age: 62
End: 2022-12-02

## 2022-12-02 ENCOUNTER — OUTPATIENT (OUTPATIENT)
Dept: OUTPATIENT SERVICES | Facility: HOSPITAL | Age: 62
LOS: 1 days | Discharge: HOME | End: 2022-12-02
Payer: COMMERCIAL

## 2022-12-02 VITALS
RESPIRATION RATE: 18 BRPM | DIASTOLIC BLOOD PRESSURE: 90 MMHG | OXYGEN SATURATION: 100 % | SYSTOLIC BLOOD PRESSURE: 141 MMHG | WEIGHT: 193 LBS | HEIGHT: 66 IN | HEART RATE: 98 BPM | TEMPERATURE: 97.3 F | BODY MASS INDEX: 31.02 KG/M2

## 2022-12-02 DIAGNOSIS — U07.1 COVID-19: ICD-10-CM

## 2022-12-02 DIAGNOSIS — Z80.0 FAMILY HISTORY OF MALIGNANT NEOPLASM OF DIGESTIVE ORGANS: ICD-10-CM

## 2022-12-02 DIAGNOSIS — Z87.891 PERSONAL HISTORY OF NICOTINE DEPENDENCE: ICD-10-CM

## 2022-12-02 DIAGNOSIS — C50.512 MALIGNANT NEOPLASM OF LOWER-OUTER QUADRANT OF LEFT FEMALE BREAST: ICD-10-CM

## 2022-12-02 DIAGNOSIS — E89.0 POSTPROCEDURAL HYPOTHYROIDISM: Chronic | ICD-10-CM

## 2022-12-02 DIAGNOSIS — Z80.3 FAMILY HISTORY OF MALIGNANT NEOPLASM OF BREAST: ICD-10-CM

## 2022-12-02 DIAGNOSIS — Z92.29 PERSONAL HISTORY OF OTHER DRUG THERAPY: ICD-10-CM

## 2022-12-02 PROCEDURE — 77263 THER RADIOLOGY TX PLNG CPLX: CPT

## 2022-12-02 PROCEDURE — 99204 OFFICE O/P NEW MOD 45 MIN: CPT | Mod: 25

## 2022-12-02 RX ORDER — NAPROXEN 500 MG/1
500 TABLET ORAL
Qty: 60 | Refills: 0 | Status: DISCONTINUED | COMMUNITY
Start: 2022-10-03

## 2022-12-02 RX ORDER — SULFAMETHOXAZOLE AND TRIMETHOPRIM 800; 160 MG/1; MG/1
800-160 TABLET ORAL TWICE DAILY
Qty: 14 | Refills: 0 | Status: DISCONTINUED | COMMUNITY
Start: 2022-11-17 | End: 2022-12-02

## 2022-12-02 RX ORDER — OXYCODONE AND ACETAMINOPHEN 5; 325 MG/1; MG/1
5-325 TABLET ORAL
Qty: 20 | Refills: 0 | Status: DISCONTINUED | COMMUNITY
Start: 2022-10-31 | End: 2022-12-02

## 2022-12-02 RX ORDER — ROSUVASTATIN CALCIUM 5 MG/1
5 TABLET, FILM COATED ORAL
Refills: 0 | Status: ACTIVE | COMMUNITY

## 2022-12-02 NOTE — HISTORY OF PRESENT ILLNESS
[FreeTextEntry1] : Ms. Saleem is a 62 year old female being seen in consultation for consideration of radiation therapy.  She is unaccompanied for today's visit.  \par \par Diagnosis: pT1c pN0 (sn)  LEFT Breast Solid Papillary Carcinoma with Invasion (1.7 cm), Grade 2 Kate score 6/9, Margins of resection - no tumor identified.  Distance to nearest margin (posterior) 0.5 cm.  Two sentinel lymph nodes with no tumor identified (0/2).    ER + (99%) FL + (99%) HER2 - \par Oncotype DX Recurrence Score:  0 \par \par **Personal history of Thyroid Cancer - s/p thyroidectomy in 2009**\par \par \par HPI :\par \par 8/15/22 - Bilateral Screening Mammogram/US:  Stable 7 mm cyst noted sonographically in the right breast.  Focal nodular density noted in the left upper outer quadrant posterior depth mammographically corresponds to a 1 cm slightly lobulated hypoechoic lesion sonographically. Ultrasound-guided core biopsy is recommended.   BIRADS 4B\par \par 8/26/22 - underwent LEFT Breast 3:00, 8 cm FN Biopsy:  Pathology -  Invasive moderately differentiated Ductal Carcinoma (9 mm) with focal  papillary features,  Kate score 6/9, DCIS and microcalcifications not present. Lymphovascular permeation by tumor not seen.  Uninvolved breast tissue shows fibrocystic changes.  ER + (99%) FL + (99%) HER2 - \par *Path. Review - Carcinoma with papillary features,  In this limited sample, the findings are most suggestive of solid\par papillary carcinoma. Frankly invasive cannot be excluded. Definitive characterization is deferred to the resection specimen.*\par \par 9/10/22 - Breast MRI:  Benign ovoid nodule in the central superior right breast corresponding to stable benign ovoid nodule on prior mammograms as described in full report.  Biopsy-proven cancer measuring approximately 1.5 cm in the posterior outer left breast in association with clip artifact for which appropriate surgical and oncologic management is recommended.  BIRADS 6\par \par 11/1/22 - underwent LEFT Breast Lumpectomy with SLN Biopsy with Dr. S. Palleschi (surgeon) and bilateral oncoplastic lift with Dr. Sheppard (plastics):  Pathology -  Solid Papillary Carcinoma with Invasion (1.7 cm), Grade 2 [place] score 6/9, Margins of resection - no tumor identified.  Distance to nearest margin (posterior) 0.5 cm. Two sentinel lymph nodes with no tumor identified (0/2).  \par Oncotype DX Recurrence Score:  0 \par \par 11/14/22 - saw Dr. Palleschi (surgeon) in follow up ... continues to heal from surgery.  Referrals made to medical and radiation oncology.  Next bilateral breast imaging in 8/2023 and left new baseline breast imaging in 3/2023. \par 11/23/22 - saw Dr. Sheppard (plastics) in follow up, erythema improved, has a 1 cm opening remaining along the left breast lateral incision without active drainage.  Nylon sutures removed.  \par \par 12/2/22 - presents in consultation for discussion of radiation therapy options.  Ms. Saleem is feeling well today and denies any pain.  She has healed well from her surgery.  She looks forward to next steps in treatment.  She is currently working and her job is remote, she is active in her daily life.  \par To see Dr. Ponce ([facility]) on 12/15/22.

## 2022-12-02 NOTE — OB/GYN HISTORY
[Currently In Menopause] : currently in menopause [Menopause Age: ____] : patient was [unfilled] years old at menopause [___] : Total Pregnancies: [unfilled] [History of Hormone Replacement Therapy] : no history of hormone replacement therapy [Experiencing Menopausal Sxs] : not experiencing menopausal symptoms

## 2022-12-02 NOTE — VITALS
[Maximal Pain Intensity: 0/10] : 0/10 [Least Pain Intensity: 0/10] : 0/10 [90: Able to carry normal activity; minor signs or symptoms of disease.] : 90: Able to carry normal activity; minor signs or symptoms of disease.  [ECOG Performance Status: 0 - Fully active, able to carry on all pre-disease performance without restriction] : Performance Status: 0 - Fully active, able to carry on all pre-disease performance without restriction [Date: ____________] : Patient's last distress assessment performed on [unfilled]. [5 - Distress Level] : Distress Level: 5 [Patient given social work contact information and resource sheet] : Patient was given social work contact information and resource sheet

## 2022-12-02 NOTE — REVIEW OF SYSTEMS
[Patient Intake Form Reviewed] : Patient intake form was reviewed [Negative] : Heme/Lymph [de-identified] : healed surgical scars on bilateral breasts and left axilla, some pulling in by left breast incision

## 2022-12-02 NOTE — PHYSICAL EXAM
[General Appearance - Well Developed] : well developed [Sclera] : the sclera and conjunctiva were normal [Outer Ear] : the ears and nose were normal in appearance [Normal] : oriented to person, place and time, the affect was normal, the mood was normal and not anxious

## 2022-12-05 ENCOUNTER — APPOINTMENT (OUTPATIENT)
Dept: PLASTIC SURGERY | Facility: CLINIC | Age: 62
End: 2022-12-05

## 2022-12-07 ENCOUNTER — APPOINTMENT (OUTPATIENT)
Dept: INFECTIOUS DISEASE | Facility: CLINIC | Age: 62
End: 2022-12-07

## 2022-12-13 ENCOUNTER — APPOINTMENT (OUTPATIENT)
Dept: PLASTIC SURGERY | Facility: CLINIC | Age: 62
End: 2022-12-13
Payer: COMMERCIAL

## 2022-12-15 ENCOUNTER — APPOINTMENT (OUTPATIENT)
Dept: HEMATOLOGY ONCOLOGY | Facility: CLINIC | Age: 62
End: 2022-12-15

## 2022-12-16 ENCOUNTER — NON-APPOINTMENT (OUTPATIENT)
Age: 62
End: 2022-12-16

## 2022-12-16 PROCEDURE — 77290 THER RAD SIMULAJ FIELD CPLX: CPT | Mod: 26

## 2022-12-16 PROCEDURE — 77334 RADIATION TREATMENT AID(S): CPT | Mod: 26

## 2022-12-20 ENCOUNTER — APPOINTMENT (OUTPATIENT)
Dept: PLASTIC SURGERY | Facility: CLINIC | Age: 62
End: 2022-12-20
Payer: COMMERCIAL

## 2022-12-21 PROBLEM — Z09 POSTOPERATIVE EXAMINATION: Status: RESOLVED | Noted: 2022-11-18 | Resolved: 2022-12-21

## 2022-12-23 PROCEDURE — 77334 RADIATION TREATMENT AID(S): CPT | Mod: 26

## 2022-12-23 PROCEDURE — 77295 3-D RADIOTHERAPY PLAN: CPT | Mod: 26

## 2022-12-23 PROCEDURE — 77300 RADIATION THERAPY DOSE PLAN: CPT | Mod: 26

## 2022-12-27 ENCOUNTER — NON-APPOINTMENT (OUTPATIENT)
Age: 62
End: 2022-12-27

## 2022-12-27 ENCOUNTER — APPOINTMENT (OUTPATIENT)
Dept: HEMATOLOGY ONCOLOGY | Facility: CLINIC | Age: 62
End: 2022-12-27

## 2022-12-27 VITALS
HEART RATE: 69 BPM | TEMPERATURE: 97.7 F | HEIGHT: 64.96 IN | DIASTOLIC BLOOD PRESSURE: 82 MMHG | BODY MASS INDEX: 32.25 KG/M2 | OXYGEN SATURATION: 98 % | RESPIRATION RATE: 18 BRPM | SYSTOLIC BLOOD PRESSURE: 147 MMHG | WEIGHT: 193.54 LBS

## 2022-12-27 DIAGNOSIS — Z87.898 PERSONAL HISTORY OF OTHER SPECIFIED CONDITIONS: ICD-10-CM

## 2022-12-27 DIAGNOSIS — R59.0 LOCALIZED ENLARGED LYMPH NODES: ICD-10-CM

## 2022-12-27 DIAGNOSIS — Z87.42 PERSONAL HISTORY OF OTHER DISEASES OF THE FEMALE GENITAL TRACT: ICD-10-CM

## 2022-12-27 DIAGNOSIS — K76.0 FATTY (CHANGE OF) LIVER, NOT ELSEWHERE CLASSIFIED: ICD-10-CM

## 2022-12-27 DIAGNOSIS — Z09 ENCOUNTER FOR FOLLOW-UP EXAMINATION AFTER COMPLETED TREATMENT FOR CONDITIONS OTHER THAN MALIGNANT NEOPLASM: ICD-10-CM

## 2022-12-27 DIAGNOSIS — R92.8 OTHER ABNORMAL AND INCONCLUSIVE FINDINGS ON DIAGNOSTIC IMAGING OF BREAST: ICD-10-CM

## 2022-12-27 DIAGNOSIS — Z86.79 PERSONAL HISTORY OF OTHER DISEASES OF THE CIRCULATORY SYSTEM: ICD-10-CM

## 2022-12-27 DIAGNOSIS — L02.416 CUTANEOUS ABSCESS OF LEFT LOWER LIMB: ICD-10-CM

## 2022-12-27 DIAGNOSIS — Z85.850 PERSONAL HISTORY OF MALIGNANT NEOPLASM OF THYROID: ICD-10-CM

## 2022-12-27 DIAGNOSIS — Z01.419 ENCOUNTER FOR GYNECOLOGICAL EXAMINATION (GENERAL) (ROUTINE) W/OUT ABNORMAL FINDINGS: ICD-10-CM

## 2022-12-27 DIAGNOSIS — Z01.818 ENCOUNTER FOR OTHER PREPROCEDURAL EXAMINATION: ICD-10-CM

## 2022-12-27 PROCEDURE — 99205 OFFICE O/P NEW HI 60 MIN: CPT

## 2022-12-27 RX ORDER — ERGOCALCIFEROL 1.25 MG/1
1.25 MG CAPSULE, LIQUID FILLED ORAL
Qty: 12 | Refills: 0 | Status: DISCONTINUED | COMMUNITY
Start: 2022-06-21 | End: 2022-12-27

## 2022-12-27 NOTE — HISTORY OF PRESENT ILLNESS
[de-identified] : Left breast cancer at age 62\par 8/15/22 Mammogram revealed a 1.1 cm nodular density in the left upper outer quadrant posterior depth. Stable focal area of asymmetric density is seen in the right central breast middle depth. Stable grouping of calcifications is noted in the left upper inner quadrant anterior depth. There is no evidence for skin thickening or nipple retraction.\par 8/15/22 Bilateral breast ultrasound: Right breast, 11-12:00 position 2 to 3 cm from the nipple a stable 7 mm cyst is noted. Left breast- at the 2:00 position 8 cm from the nipple a 1 cm slightly lobulated hypoechoic lesion is seen. This corresponds to the mammographic finding in this region. Ultrasound-guided core biopsy is recommended. BI-RADS 4B. \par 8/26/2022 US guided left breast 3:00 core biopsy: A ribbon shaped marking clip was placed. 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 Kate score 6/9 (3+1+1), ER/UT 99%, HER-s negative. Invasive tumor measures at least 9 mm. DCIS not present. These results are concordant. \par 09/06/22 Genetic testing with [employer] 47 gene panel negative for pathogenic variants\par 09/10/22 Bilateral breast MRI: Right breast: There are scattered enhancing nonspecific foci. There is no suspicious enhancement in the right breast.n the central slightly superior right breast seen on sequence 3-2 02 image 61 a benign well-defined T2 bright 8 mm nodule is seen with benign persistent type kinetics corresponding to the mammographically stable nodule dating to 2015 . Benign corresponding finding was reported on prior ultrasounds.\par Left breast: There are scattered enhancing nonspecific foci.In the posterior outer left breast corresponding to the biopsy-proven cancer a suspicious irregular approximately 1.5 cm mass is seen in association with susceptibility artifact for which appropriate surgical and oncologic management is recommended.\par Axilla: There is no significant axillary or internal mammary lymphadenopathy.\par 11/1/22 Left lumpectomy and SLN with Dr. Palleschi and bilateral oncoplastic lift with Dr. Sheppard. \par Pathology revealed: Left axillary sentinel lymph nodes x 2, no tumor identified. Left breast 3:00, lumpectomy: Solid papillary carcinoma with invasion, Grade 2; Tumor size 1.7 cm (pT1c) Margins of resection, no tumor identified. \par Oncotype Dx recurrence score is 0 which predicts a 3% risk of distant recurrence at 9 years and <1% predicted benefit from chemotherapy\par  [de-identified] : 1.7 cm IDC, SBR 6/9, ER/FL 99%, HER-2 negative, 0/2 SLN, Oncotype recurrence score = 0 (RR 3%) [de-identified] : Sarita comes to discuss the medical management of her newly diagnosed breast cancer.\par She saw Dr. Krista Giraldo on 12/2/2022 and is planned for adjuvant radiation to the left breast in 1/2023 with 4240 cGy. \par She is  and has two children ages 25 and 22 who are both living at home. \par \par HEALTH MAINTENANCE:\par PCP: Dr. Zack Napoles\par GYN: Dr. Joby Bellamy\par Mammogram and breast ultrasound: 8/15/22\par Pap Smear: 7/5/22 negative\par Bone density: 7/18/22 showed T scores of 0.4 in spine, -0.5 in femoral neck and 0.1 in total hip\par Colonoscopy: 7/2022 with one benign polyp, next in 3-5 years\par Genetics: 09/06/22 [employer] 47 gene panel negative for pathogenic variants\par

## 2022-12-27 NOTE — CONSULT LETTER
[DrAna  ___] : Dr. MIRANDA [DrAna ___] : Dr. MIRANDA [FreeTextEntry2] : Susan Palleschi, MD\par [address]\par [address]\[place] [address]\par  [FreeTextEntry3] : Mayi Ponce MD\par Associate Chief \par [facility]\par [facility]\par  of Medicine\par [employer]\par \par

## 2022-12-27 NOTE — PHYSICAL EXAM
[Normal] : bilateral breasts without nipple retraction, skin dimpling or palpable masses; the bilateral axillae are without adenopathy [de-identified] : Well healing incisions left breast and axilla

## 2022-12-30 PROCEDURE — 77280 THER RAD SIMULAJ FIELD SMPL: CPT | Mod: 26

## 2023-01-03 PROCEDURE — G6017: CPT

## 2023-01-04 ENCOUNTER — NON-APPOINTMENT (OUTPATIENT)
Age: 63
End: 2023-01-04

## 2023-01-04 ENCOUNTER — APPOINTMENT (OUTPATIENT)
Dept: PLASTIC SURGERY | Facility: CLINIC | Age: 63
End: 2023-01-04
Payer: COMMERCIAL

## 2023-01-04 VITALS
DIASTOLIC BLOOD PRESSURE: 83 MMHG | TEMPERATURE: 95.8 F | HEART RATE: 97 BPM | HEIGHT: 64.96 IN | RESPIRATION RATE: 16 BRPM | OXYGEN SATURATION: 85 % | BODY MASS INDEX: 32.15 KG/M2 | SYSTOLIC BLOOD PRESSURE: 166 MMHG | WEIGHT: 193 LBS

## 2023-01-04 DIAGNOSIS — Z92.3 PERSONAL HISTORY OF IRRADIATION: ICD-10-CM

## 2023-01-04 PROCEDURE — 99024 POSTOP FOLLOW-UP VISIT: CPT

## 2023-01-04 PROCEDURE — G6017: CPT

## 2023-01-04 NOTE — ASSESSMENT
[FreeTextEntry1] : Left breast invasive carcinoma, stage I.\par ER positive/ND positive/TRF2nlx negative.\par \par 1. Continue XRT with Dr. Krista Giraldo\par 2.  Annual bilateral diagnostic mammogram and breast ultrasound due 8/2023\par 3. Follow up office visit due 5/2023\par 4. Advise monthly self breast examinations and advised her to contact me if she has any concerns. \par 5. Left neobaseline mammogram and breast ultrasound due 3-4/2023\par 6. To start Anastrozole once XRT completed

## 2023-01-04 NOTE — HISTORY OF PRESENT ILLNESS
[FreeTextEntry1] : Ms. Saleem is a 62 year old female being seen in for an on treatment visit.  She is unaccompanied for today's visit.  \par \par Diagnosis: pT1c pN0 (sn)  LEFT Breast Solid Papillary Carcinoma with Invasion (1.7 cm), Grade 2 Kate score 6/9, Margins of resection - no tumor identified.  Distance to nearest margin (posterior) 0.5 cm.  Two sentinel lymph nodes with no tumor identified (0/2).    ER + (99%) [place] + (99%) HER2 - \par Oncotype DX Recurrence Score:  0 \par \par **Personal history of Thyroid Cancer - s/p thyroidectomy in 2009**\par \par \par Oncologic Management :\par \par 8/26/22 - underwent LEFT Breast 3:00, 8 cm FN Biopsy:  Pathology -  Invasive moderately differentiated Ductal Carcinoma (9 mm) with focal  papillary features,  Arg[place] score 6/9, DCIS and microcalcifications not present. Lymphovascular permeation by tumor not seen.  Uninvolved breast tissue shows fibrocystic changes.  ER + (99%) [place] + (99%) HER2 - \par *Path. Review - Carcinoma with papillary features,  In this limited sample, the findings are most suggestive of solid\par papillary carcinoma. Frankly invasive cannot be excluded. Definitive characterization is deferred to the resection specimen.*\par \par 11/1/22 - underwent LEFT Breast Lumpectomy with SLN Biopsy with Dr. S. Palleschi (surgeon) and bilateral oncoplastic lift with Dr. Sheppard (plastics):  Pathology -  Solid Papillary Carcinoma with Invasion (1.7 cm), Grade 2 Argonne score 6/9, Margins of resection - no tumor identified.  Distance to nearest margin (posterior) 0.5 cm. Two sentinel lymph nodes with no tumor identified (0/2).  \par Oncotype DX Recurrence Score:  0 \par \par 11/14/22 - saw Dr. Palleschi (surgeon) in follow up ... continues to heal from surgery.  Referrals made to medical and radiation oncology.  Next bilateral breast imaging in 8/2023 and left new baseline breast imaging in 3/2023. \par 11/23/22 - saw Dr. Sheppard (plastics) in follow up, erythema improved, has a 1 cm opening remaining along the left breast lateral incision without active drainage.  Nylon sutures removed.  \par \par 12/15/22 - saw Dr. Ponce ([facility]) in consultation ... to start on Anastrozole after completion of radiation \par \par 1/3/23 - started on RADIATION Therapy to LEFT Breast, 4240 cGy in 16 fx \par \par 1/4/23 - OTV 2/16 fx completed.  She is tolerating RT well without breast pain or fatigue. She is using Lubriderm on the skin.

## 2023-01-04 NOTE — PHYSICAL EXAM
[Normal] : well developed, well nourished, in no acute distress [de-identified] : Left lumpectomy sites intact, no erythema.

## 2023-01-04 NOTE — DISEASE MANAGEMENT
[Pathological] : TNM Stage: p [I] : I [TTNM] : 1c [NTNM] : 0 [MTNM] : 0 [de-identified] : 530cGy [de-identified] : 3129nNk [de-identified] : LEFT Breast

## 2023-01-04 NOTE — HISTORY OF PRESENT ILLNESS
[FreeTextEntry1] : Patient is a 62 year old female here today for her second post operative visit. \par She has a history of thyroid cancer (dx in 2009), treated with total thyroidectomy. \par Family history of breast cancer in her paternal grandmother (dx ~ 87 years old) and paternal cousin (dx ~40 years old). \par Family history of pancreatic cancer in her maternal uncle, diagnosed at age 70. \par 2 of the patient's sisters have had genetic testing, both BRCA -. \par 8/15/2022 Bilateral mammogram: [facility] Lifetime Risk: 9.1%\par A 1.1 cm dense nodular density is noted in the left upper outer quadrant posterior depth for which ultrasound examination is to follow.\par Stable focal area of asymmetric density is seen in the right central breast middle depth. Stable grouping of calcifications is noted in the left upper inner quadrant anterior depth. There is no evidence for skin thickening or nipple retraction.\par Bilateral breast ultrasound: Right breast- at the 11-12:00 position 2 to 3 cm from the nipple a stable 7 mm cyst is noted. Left breast- at the 2:00 position 8 cm from the nipple a 1 cm slightly lobulated hypoechoic lesion is seen. This corresponds to the mammographic finding in this region. Ultrasound-guided core biopsy is recommended. Bi-rads 4B. \par 8/26/2022 US guided left breast 3:00 (8cmFN) core biopsy: A ribbon shaped marking clip was placed. 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 Kate score 6/9 (3+1+1) in this limited material. Invasive tumor measures at least 9 mm. DCIS not present. These results are concordant. Surgical/oncologic management is recommended and consideration to presurgical MRI. ER+/MA+/Her-2 negative. \par 09/10/22 Bilateral breast MRI: Right breast: There are scattered enhancing nonspecific foci. There is no suspicious enhancement in the right breast.n the central slightly superior right breast seen on sequence 3-2 02 image 61 a benign well-defined T2 bright 8mm nodule is seen with benign persistent type kinetics corresponding to the mammographically stable nodule dating to 2015 . Benign corresponding finding was reported on prior ultrasounds.\par Left breast: There are scattered enhancing nonspecific foci.In the posterior outer left breast corresponding to the biopsy-proven cancer a suspicious irregular approximately 1.5 cm mass is seen in association with susceptibility artifact for which appropriate surgical and oncologic management is recommended.\par Axilla: There is no significant axillary or internal mammary lymphadenopathy.\par Surgical or oncologic managements recommended. Bi-rads 6 \par 10/31/22 Slide consult: Pathology revealed In this limited sample, the findings are most suggestive of solid\par papillary carcinoma. Frankly invasive cannot be excluded. Definitive characterization is deferred to the resection specimen. ER+/MA+/HER2-\par 11/1/22 s/p left 3:00 FAUSTINO  localization wide excision lumpectomy of invasive ductal breast cancer with left axillary sentinel lymph node biopsy and bilateral oncoplastic lift with Dr. Sheppard. \par Pathology revealed: Left axillary sentinel lymph nodes x 2, no tumor identified; Right breast tissue, excision: Skin, no tumor identified; Left breast 3:00, lumpectomy: Solid papillary carcinoma with invasion, Grade 2; Tumor size 1.7 cm (pT1c) Margins of resection, no tumor identified. Left breast 3:00, scar, core biopsy: Skin with fibrosis, no tumor identified; Left breast skin tissue, excision: Skin, no tumor identified\par Plastics: Dr. Sheppard; last seen 12/1/2022. \par Oncotype Dx recurrence score: 0\par She had an I&D of the left hemoatoma with Dr. Sheppard on 11/17/2022. It has since resolved.\par Med Onc: Dr. Ponce. She saw her 12/27/2022. She advised starting Anastrozole once XRT is completed. \par Rad Onc: Dr. Krista Giraldo; she started XRT 1/3/2023 and is scheduled to complete 16 fractions on 1/25/2023

## 2023-01-04 NOTE — PHYSICAL EXAM
[Normocephalic] : normocephalic [EOMI] : extra ocular movement intact [Sclera nonicteric] : sclera nonicteric [Examined in the supine and seated position] : examined in the supine and seated position [de-identified] : S/P bilateral oncoplastic reduction, left lumpectomy, sentinel node biopsy. Right breast is perkins than left. Incisions healing well, C/D/I. No erythema or warmth. No e/o infection. Left hematoma no longer palpable.

## 2023-01-04 NOTE — CONSULT LETTER
[Dear  ___] : Dear  [unfilled], [Consult Letter:] : I had the pleasure of evaluating your patient, [unfilled]. [Please see my note below.] : Please see my note below. [Consult Closing:] : Thank you very much for allowing me to participate in the care of this patient.  If you have any questions, please do not hesitate to contact me. [Sincerely,] : Sincerely, [DrAna  ___] : Dr. MIRANDA [FreeTextEntry3] : Shannon Mays, RPA-C\par Breast Surgery\par [address]\par [address]\[place] [address]\par (Phone) [phone]\par (Fax) [phone]

## 2023-01-05 PROCEDURE — G6017: CPT

## 2023-01-06 PROCEDURE — G6017: CPT

## 2023-01-09 ENCOUNTER — NON-APPOINTMENT (OUTPATIENT)
Age: 63
End: 2023-01-09

## 2023-01-09 PROCEDURE — 77427 RADIATION TX MANAGEMENT X5: CPT

## 2023-01-09 PROCEDURE — G6017: CPT

## 2023-01-09 NOTE — DISEASE MANAGEMENT
[Pathological] : TNM Stage: p [I] : I [TTNM] : 1c [NTNM] : 0 [MTNM] : 0 [de-identified] : [id number] [de-identified] : 1228iTd [de-identified] : LEFT Breast

## 2023-01-09 NOTE — REVIEW OF SYSTEMS
[Alopecia: Grade 0] : Alopecia: Grade 0 [Pruritus: Grade 0] : Pruritus: Grade 0 [Skin Atrophy: Grade 0] : Skin Atrophy: Grade 0 [Skin Hyperpigmentation: Grade 0] : Skin Hyperpigmentation: Grade 0 [Skin Induration: Grade 0] : Skin Induration: Grade 0 [Dermatitis Radiation: Grade 0] : Dermatitis Radiation: Grade 0 [Fatigue: Grade 0] : Fatigue: Grade 0

## 2023-01-09 NOTE — HISTORY OF PRESENT ILLNESS
[FreeTextEntry1] : Ms. Saleem is a 62 year old female being seen in for an on treatment visit.  She is unaccompanied for today's visit.  \par \par Diagnosis: pT1c pN0 (sn)  LEFT Breast Solid Papillary Carcinoma with Invasion (1.7 cm), Grade 2 Kate score 6/9, Margins of resection - no tumor identified.  Distance to nearest margin (posterior) 0.5 cm.  Two sentinel lymph nodes with no tumor identified (0/2).    ER + (99%) [place] + (99%) HER2 - \par Oncotype DX Recurrence Score:  0 \par \par **Personal history of Thyroid Cancer - s/p thyroidectomy in 2009**\par \par \par Oncologic Management :\par \par 8/26/22 - underwent LEFT Breast 3:00, 8 cm FN Biopsy:  Pathology -  Invasive moderately differentiated Ductal Carcinoma (9 mm) with focal  papillary features,  [place] score 6/9, DCIS and microcalcifications not present. Lymphovascular permeation by tumor not seen.  Uninvolved breast tissue shows fibrocystic changes.  ER + (99%) [place] + (99%) HER2 - \par *Path. Review - Carcinoma with papillary features,  In this limited sample, the findings are most suggestive of solid\par papillary carcinoma. Frankly invasive cannot be excluded. Definitive characterization is deferred to the resection specimen.*\par \par 11/1/22 - underwent LEFT Breast Lumpectomy with SLN Biopsy with Dr. S. Palleschi (surgeon) and bilateral oncoplastic lift with Dr. Sheppard (plastics):  Pathology -  Solid Papillary Carcinoma with Invasion (1.7 cm), Grade 2 [place] score 6/9, Margins of resection - no tumor identified.  Distance to nearest margin (posterior) 0.5 cm. Two sentinel lymph nodes with no tumor identified (0/2).  \par Oncotype DX Recurrence Score:  0 \par \par 11/14/22 - saw Dr. Palleschi (surgeon) in follow up ... continues to heal from surgery.  Referrals made to medical and radiation oncology.  Next bilateral breast imaging in 8/2023 and left new baseline breast imaging in 3/2023. \par 11/23/22 - saw Dr. Sheppard (plastics) in follow up, erythema improved, has a 1 cm opening remaining along the left breast lateral incision without active drainage.  Nylon sutures removed.  \par \par 12/15/22 - saw Dr. Ponce ([facility]) in consultation ... to start on Anastrozole after completion of radiation \par \par 1/3/23 - started on RADIATION Therapy to LEFT Breast, 4240 cGy in 16 fx \par \par 1/4/23 - OTV 2/16 fx completed.  She is tolerating RT well without breast pain or fatigue. She is using Lubriderm on the skin.\par \par 1/9/23 - OTV 5/16 fx completed.  Tolerating RT well. Has occasional brief twinges of pain to left breast. Skin care reinforced.

## 2023-01-10 PROCEDURE — G6017: CPT

## 2023-01-11 PROCEDURE — G6017: CPT

## 2023-01-12 PROCEDURE — G6017: CPT

## 2023-01-13 PROCEDURE — G6017: CPT

## 2023-01-17 PROCEDURE — G6017: CPT

## 2023-01-18 ENCOUNTER — NON-APPOINTMENT (OUTPATIENT)
Age: 63
End: 2023-01-18

## 2023-01-18 PROCEDURE — G6017: CPT

## 2023-01-18 NOTE — VITALS
[NoTreatment Scheduled] : no treatment scheduled [Maximal Pain Intensity: 0/10] : 0/10 [Least Pain Intensity: 0/10] : 0/10 [90: Able to carry normal activity; minor signs or symptoms of disease.] : 90: Able to carry normal activity; minor signs or symptoms of disease.  [ECOG Performance Status: 1 - Restricted in physically strenuous activity but ambulatory and able to carry out work of a light or sedentary nature] : Performance Status: 1 - Restricted in physically strenuous activity but ambulatory and able to carry out work of a light or sedentary nature, e.g., light house work, office work

## 2023-01-18 NOTE — HISTORY OF PRESENT ILLNESS
[FreeTextEntry1] : Ms. Saleem is a 62 year old female being seen in for an on treatment visit.  She is unaccompanied for today's visit.  \par \par Diagnosis: pT1c pN0 (sn)  LEFT Breast Solid Papillary Carcinoma with Invasion (1.7 cm), Grade 2 Kate score 6/9, Margins of resection - no tumor identified.  Distance to nearest margin (posterior) 0.5 cm.  Two sentinel lymph nodes with no tumor identified (0/2).    ER + (99%) [place] + (99%) HER2 - \par Oncotype DX Recurrence Score:  0 \par \par **Personal history of Thyroid Cancer - s/p thyroidectomy in 2009**\par \par \par Oncologic Management :\par \par 8/26/22 - underwent LEFT Breast 3:00, 8 cm FN Biopsy:  Pathology -  Invasive moderately differentiated Ductal Carcinoma (9 mm) with focal  papillary features,  De Witt score 6/9, DCIS and microcalcifications not present. Lymphovascular permeation by tumor not seen.  Uninvolved breast tissue shows fibrocystic changes.  ER + (99%) [place] + (99%) HER2 - \par *Path. Review - Carcinoma with papillary features,  In this limited sample, the findings are most suggestive of solid\par papillary carcinoma. Frankly invasive cannot be excluded. Definitive characterization is deferred to the resection specimen.*\par \par 11/1/22 - underwent LEFT Breast Lumpectomy with SLN Biopsy with Dr. S. Palleschi (surgeon) and bilateral oncoplastic lift with Dr. Sheppard (plastics):  Pathology -  Solid Papillary Carcinoma with Invasion (1.7 cm), Grade 2 [place] Witt score 6/9, Margins of resection - no tumor identified.  Distance to nearest margin (posterior) 0.5 cm. Two sentinel lymph nodes with no tumor identified (0/2).  \par Oncotype DX Recurrence Score:  0 \par \par 11/14/22 - saw Dr. Palleschi (surgeon) in follow up ... continues to heal from surgery.  Referrals made to medical and radiation oncology.  Next bilateral breast imaging in 8/2023 and left new baseline breast imaging in 3/2023. \par 11/23/22 - saw Dr. Sheppard (plastics) in follow up, erythema improved, has a 1 cm opening remaining along the left breast lateral incision without active drainage.  Nylon sutures removed.  \par \par 12/15/22 - saw Dr. Ponce ([facility]) in consultation ... to start on Anastrozole after completion of radiation \par \par 1/3/23 - started on RADIATION Therapy to LEFT Breast, 4240 cGy in 16 fx \par \par 1/4/23 - OTV 2/16 fx completed.  She is tolerating RT well without breast pain or fatigue. She is using Lubriderm on the skin.\par \par 1/9/23 - OTV 5/16 fx completed.  Tolerating RT well. Has occasional brief twinges of pain to left breast. Skin care reinforced. \par \par 1/18/23 - OTV 11/16 fx completed.  Ms. Saleem is tolerating radiation treatments well, she has some occasional twinges in her breast but denies pain.  Skin with some pinkness, she is moisturizing her skin at least twice daily.

## 2023-01-18 NOTE — DISEASE MANAGEMENT
[Pathological] : TNM Stage: p [I] : I [TTNM] : 1c [NTNM] : 0 [MTNM] : 0 [de-identified] : [id number] [de-identified] : [id number] [de-identified] : LEFT Breast

## 2023-01-18 NOTE — REVIEW OF SYSTEMS
[Fatigue: Grade 0] : Fatigue: Grade 0 [Alopecia: Grade 0] : Alopecia: Grade 0 [Pruritus: Grade 0] : Pruritus: Grade 0 [Skin Atrophy: Grade 0] : Skin Atrophy: Grade 0 [Skin Induration: Grade 0] : Skin Induration: Grade 0 [Edema Limbs: Grade 0] : Edema Limbs: Grade 0  [Localized Edema: Grade 0] : Localized Edema: Grade 0  [Breast Pain: Grade 0] : Breast Pain: Grade 0 [Cough: Grade 0] : Cough: Grade 0 [Dyspnea: Grade 0] : Dyspnea: Grade 0 [Dermatitis Radiation: Grade 1 - Faint erythema or dry desquamation] : Dermatitis Radiation: Grade 1 - Faint erythema or dry desquamation [Skin Hyperpigmentation: Grade 1 - Hyperpigmentation covering <10% BSA; no psychosocial impact] : Skin Hyperpigmentation: Grade 1 - Hyperpigmentation covering <10% BSA; no psychosocial impact [FreeTextEntry6] : moisturizing her skin at least twice daily

## 2023-01-19 PROCEDURE — G6017: CPT

## 2023-01-20 PROCEDURE — G6017: CPT

## 2023-01-23 ENCOUNTER — NON-APPOINTMENT (OUTPATIENT)
Age: 63
End: 2023-01-23

## 2023-01-23 PROCEDURE — G6017: CPT

## 2023-01-23 NOTE — DISEASE MANAGEMENT
[Pathological] : TNM Stage: p [I] : I [TTNM] : 1c [NTNM] : 0 [MTNM] : 0 [de-identified] : [id number] [de-identified] : [id number] [de-identified] : LEFT Breast

## 2023-01-23 NOTE — REVIEW OF SYSTEMS
[Edema Limbs: Grade 0] : Edema Limbs: Grade 0  [Fatigue: Grade 0] : Fatigue: Grade 0 [Localized Edema: Grade 0] : Localized Edema: Grade 0  [Breast Pain: Grade 0] : Breast Pain: Grade 0 [Cough: Grade 0] : Cough: Grade 0 [Dyspnea: Grade 0] : Dyspnea: Grade 0 [Alopecia: Grade 0] : Alopecia: Grade 0 [Skin Atrophy: Grade 0] : Skin Atrophy: Grade 0 [Skin Hyperpigmentation: Grade 1 - Hyperpigmentation covering <10% BSA; no psychosocial impact] : Skin Hyperpigmentation: Grade 1 - Hyperpigmentation covering <10% BSA; no psychosocial impact [Skin Induration: Grade 0] : Skin Induration: Grade 0 [Dermatitis Radiation: Grade 1 - Faint erythema or dry desquamation] : Dermatitis Radiation: Grade 1 - Faint erythema or dry desquamation [Pruritus: Grade 1 - Mild or localized; topical intervention indicated] : Pruritus: Grade 1 - Mild or localized; topical intervention indicated [FreeTextEntry6] : moisturizing her skin at least twice daily

## 2023-01-23 NOTE — PHYSICAL EXAM
[No UE Edema] : there is no upper extremity edema [Normal] : no palpable adenopathy [de-identified] : dermatitis left breast, no desquamation

## 2023-01-23 NOTE — HISTORY OF PRESENT ILLNESS
[FreeTextEntry1] : Ms. Saleem is a 62 year old female being seen in for an on treatment visit.  She is unaccompanied for today's visit.  \par \par Diagnosis: pT1c pN0 (sn)  LEFT Breast Solid Papillary Carcinoma with Invasion (1.7 cm), Grade 2 Kate score 6/9, Margins of resection - no tumor identified.  Distance to nearest margin (posterior) 0.5 cm.  Two sentinel lymph nodes with no tumor identified (0/2).    ER + (99%) [place] + (99%) HER2 - \par Oncotype DX Recurrence Score:  0 \par \par **Personal history of Thyroid Cancer - s/p thyroidectomy in 2009**\par \par \par Oncologic Management :\par \par 8/26/22 - underwent LEFT Breast 3:00, 8 cm FN Biopsy:  Pathology -  Invasive moderately differentiated Ductal Carcinoma (9 mm) with focal  papillary features,  [place] score 6/9, DCIS and microcalcifications not present. Lymphovascular permeation by tumor not seen.  Uninvolved breast tissue shows fibrocystic changes.  ER + (99%) [place] + (99%) HER2 - \par *Path. Review - Carcinoma with papillary features,  In this limited sample, the findings are most suggestive of solid\par papillary carcinoma. Frankly invasive cannot be excluded. Definitive characterization is deferred to the resection specimen.*\par \par 11/1/22 - underwent LEFT Breast Lumpectomy with SLN Biopsy with Dr. S. Palleschi (surgeon) and bilateral oncoplastic lift with Dr. Sheppard (plastics):  Pathology -  Solid Papillary Carcinoma with Invasion (1.7 cm), Grade 2 Moline score 6/9, Margins of resection - no tumor identified.  Distance to nearest margin (posterior) 0.5 cm. Two sentinel lymph nodes with no tumor identified (0/2).  \par Oncotype DX Recurrence Score:  0 \par \par 11/14/22 - saw Dr. Palleschi (surgeon) in follow up ... continues to heal from surgery.  Referrals made to medical and radiation oncology.  Next bilateral breast imaging in 8/2023 and left new baseline breast imaging in 3/2023. \par 11/23/22 - saw Dr. Sheppard (plastics) in follow up, erythema improved, has a 1 cm opening remaining along the left breast lateral incision without active drainage.  Nylon sutures removed.  \par \par 12/15/22 - saw Dr. Ponce ([facility]) in consultation ... to start on Anastrozole after completion of radiation \par \par 1/3/23 - started on RADIATION Therapy to LEFT Breast, 4240 cGy in 16 fx \par \par 1/4/23 - OTV 2/16 fx completed.  She is tolerating RT well without breast pain or fatigue. She is using Lubriderm on the skin.\par \par 1/9/23 - OTV 5/16 fx completed.  Tolerating RT well. Has occasional brief twinges of pain to left breast. Skin care reinforced. \par \par 1/18/23 - OTV 11/16 fx completed.  Ms. Saleem is tolerating radiation treatments well, she has some occasional twinges in her breast but denies pain.  Skin with some pinkness, she is moisturizing her skin at least twice daily.  \par \par 1/23/23 - OTV 14/16 fx completed. Ms. Saleem is feeling well, complains of some itching of the left breast.  She continues to moisturize she skin at least twice daily.  Has slight increase in skin reaction this week.  Discharge instructions/folder reviewed.  She is aware that post treatment evaluation appointment to be scheduled and that she will be made aware of the appointment.

## 2023-01-24 PROCEDURE — G6017: CPT

## 2023-01-24 PROCEDURE — 77427 RADIATION TX MANAGEMENT X5: CPT

## 2023-01-25 PROCEDURE — G6017: CPT

## 2023-02-07 ENCOUNTER — NON-APPOINTMENT (OUTPATIENT)
Age: 63
End: 2023-02-07

## 2023-03-12 ENCOUNTER — NON-APPOINTMENT (OUTPATIENT)
Age: 63
End: 2023-03-12

## 2023-03-13 ENCOUNTER — APPOINTMENT (OUTPATIENT)
Dept: RADIATION ONCOLOGY | Facility: CLINIC | Age: 63
End: 2023-03-13

## 2023-04-05 ENCOUNTER — APPOINTMENT (OUTPATIENT)
Dept: RADIATION ONCOLOGY | Facility: CLINIC | Age: 63
End: 2023-04-05
Payer: COMMERCIAL

## 2023-04-05 VITALS
HEART RATE: 76 BPM | BODY MASS INDEX: 26.82 KG/M2 | OXYGEN SATURATION: 100 % | DIASTOLIC BLOOD PRESSURE: 97 MMHG | WEIGHT: 161 LBS | TEMPERATURE: 97.52 F | RESPIRATION RATE: 17 BRPM | SYSTOLIC BLOOD PRESSURE: 183 MMHG

## 2023-04-05 VITALS
HEART RATE: 84 BPM | BODY MASS INDEX: 30.99 KG/M2 | SYSTOLIC BLOOD PRESSURE: 133 MMHG | TEMPERATURE: 96.98 F | OXYGEN SATURATION: 100 % | RESPIRATION RATE: 17 BRPM | WEIGHT: 186 LBS | DIASTOLIC BLOOD PRESSURE: 83 MMHG

## 2023-04-05 PROCEDURE — 99024 POSTOP FOLLOW-UP VISIT: CPT

## 2023-04-05 RX ORDER — SILVER SULFADIAZINE 10 MG/G
1 CREAM TOPICAL TWICE DAILY
Qty: 1 | Refills: 1 | Status: DISCONTINUED | COMMUNITY
Start: 2023-01-23 | End: 2023-04-05

## 2023-04-05 RX ORDER — TRIAMCINOLONE ACETONIDE 5 MG/G
0.5 OINTMENT TOPICAL 3 TIMES DAILY
Qty: 1 | Refills: 1 | Status: DISCONTINUED | COMMUNITY
Start: 2023-01-23 | End: 2023-04-05

## 2023-04-05 NOTE — HISTORY OF PRESENT ILLNESS
[FreeTextEntry1] : Ms. Saleem is a 63 year old female being seen in for post treatment evaluation appointment.   She is unaccompanied for today's visit.  \par \par Diagnosis: pT1c pN0 (sn)  LEFT Breast Solid Papillary Carcinoma with Invasion (1.7 cm), Grade 2 Wain[place]ott score 6/9, Margins of resection - no tumor identified.  Distance to nearest margin (posterior) 0.5 cm.  Two sentinel lymph nodes with no tumor identified (0/2).    ER + (99%) CT + (99%) HER2 - \par Oncotype DX Recurrence Score:  0 \par \par **Personal history of Thyroid Cancer - s/p thyroidectomy in 2009**\par \par \par Oncologic Management :\par \par 8/26/22 - underwent LEFT Breast 3:00, 8 cm FN Biopsy:  Pathology -  Invasive moderately differentiated Ductal Carcinoma (9 mm) with focal  papillary features,  Wainscott score 6/9, DCIS and microcalcifications not present. Lymphovascular permeation by tumor not seen.  Uninvolved breast tissue shows fibrocystic changes.  ER + (99%) CT + (99%) HER2 - \par *Path. Review - Carcinoma with papillary features,  In this limited sample, the findings are most suggestive of solid\par papillary carcinoma. Frankly invasive cannot be excluded. Definitive characterization is deferred to the resection specimen.*\par \par 11/1/22 - underwent LEFT Breast Lumpectomy with SLN Biopsy with Dr. S. Palleschi (surgeon) and bilateral oncoplastic lift with Dr. Sheppard (plastics):  Pathology -  Solid Papillary Carcinoma with Invasion (1.7 cm), Grade 2 Wainscott score 6/9, Margins of resection - no tumor identified.  Distance to nearest margin (posterior) 0.5 cm. Two sentinel lymph nodes with no tumor identified (0/2).  \par Oncotype DX Recurrence Score:  0 \par \par 11/14/22 - saw Dr. Palleschi (surgeon) in follow up ... continues to heal from surgery.  Referrals made to medical and radiation oncology.  Next bilateral breast imaging in 8/2023 and new baseline left breast imaging in 3/2023. \par 11/23/22 - saw Dr. Sheppard (plastics) in follow up, erythema improved, has a 1 cm opening remaining along the left breast lateral incision without active drainage.  Nylon sutures removed.  \par \par 12/15/22 - saw Dr. Ponce ([facility]) in consultation ... to start on Anastrozole after completion of radiation \par \par 1/3/23 - 1/25/23:  Received RADIATION Therapy to LEFT Breast, 4240 cGy in 16 fx \par \par 2/2023 - started on Anastrozole (Dr. Ponce - [facility])\par \par 4/5/23 -  presents for post treatment evaluation appointment.    Ms. Saleem is feeling well, has occasional twinges in her breast but denies pain.  Skin looks good some mild residual hyperpigmentation remains, moisturizing prn.  She has started on Anastrozole which she is tolerating well, no adverse reactions, to follow up with Dr. Ponce ([facility]) in May.  She is to have Left Mammo/Sono on 4/20/23 and to follow up with Dr. Palleschi (surgeon) in May.

## 2023-04-05 NOTE — REVIEW OF SYSTEMS
[Edema Limbs: Grade 0] : Edema Limbs: Grade 0  [Fatigue: Grade 0] : Fatigue: Grade 0 [Localized Edema: Grade 0] : Localized Edema: Grade 0  [Breast Pain: Grade 0] : Breast Pain: Grade 0 [Cough: Grade 0] : Cough: Grade 0 [Dyspnea: Grade 0] : Dyspnea: Grade 0 [Alopecia: Grade 0] : Alopecia: Grade 0 [Pruritus: Grade 0] : Pruritus: Grade 0 [Skin Atrophy: Grade 0] : Skin Atrophy: Grade 0 [Skin Hyperpigmentation: Grade 1 - Hyperpigmentation covering <10% BSA; no psychosocial impact] : Skin Hyperpigmentation: Grade 1 - Hyperpigmentation covering <10% BSA; no psychosocial impact [Skin Induration: Grade 0] : Skin Induration: Grade 0 [Dermatitis Radiation: Grade 0] : Dermatitis Radiation: Grade 0 [FreeTextEntry4] : moisturizing prn, some mild residual hyperpigmentation

## 2023-04-05 NOTE — PHYSICAL EXAM
[Sclera] : the sclera and conjunctiva were normal [Outer Ear] : the ears and nose were normal in appearance [Normal] : normal heart rate and rhythm, normal S1 and S2, and no murmurs present [No UE Edema] : there is no upper extremity edema [de-identified] : radiation changes to left breast improving

## 2023-04-26 ENCOUNTER — OUTPATIENT (OUTPATIENT)
Dept: OUTPATIENT SERVICES | Facility: HOSPITAL | Age: 63
LOS: 1 days | Discharge: HOME | End: 2023-04-26

## 2023-04-26 DIAGNOSIS — E89.0 POSTPROCEDURAL HYPOTHYROIDISM: Chronic | ICD-10-CM

## 2023-04-26 DIAGNOSIS — Z85.3 PERSONAL HISTORY OF MALIGNANT NEOPLASM OF BREAST: ICD-10-CM

## 2023-05-03 ENCOUNTER — OUTPATIENT (OUTPATIENT)
Dept: OUTPATIENT SERVICES | Facility: HOSPITAL | Age: 63
LOS: 1 days | Discharge: HOME | End: 2023-05-03

## 2023-05-03 DIAGNOSIS — E89.0 POSTPROCEDURAL HYPOTHYROIDISM: Chronic | ICD-10-CM

## 2023-05-03 DIAGNOSIS — Z85.3 PERSONAL HISTORY OF MALIGNANT NEOPLASM OF BREAST: ICD-10-CM

## 2023-05-04 ENCOUNTER — APPOINTMENT (OUTPATIENT)
Dept: HEMATOLOGY ONCOLOGY | Facility: CLINIC | Age: 63
End: 2023-05-04
Payer: COMMERCIAL

## 2023-05-04 VITALS
TEMPERATURE: 97.3 F | WEIGHT: 187.39 LBS | HEART RATE: 81 BPM | BODY MASS INDEX: 31.22 KG/M2 | SYSTOLIC BLOOD PRESSURE: 125 MMHG | OXYGEN SATURATION: 97 % | RESPIRATION RATE: 20 BRPM | DIASTOLIC BLOOD PRESSURE: 82 MMHG | HEIGHT: 64.96 IN

## 2023-05-04 DIAGNOSIS — Z17.0 PERSONAL HISTORY OF MALIGNANT NEOPLASM OF BREAST: ICD-10-CM

## 2023-05-04 DIAGNOSIS — Z85.3 PERSONAL HISTORY OF MALIGNANT NEOPLASM OF BREAST: ICD-10-CM

## 2023-05-04 DIAGNOSIS — R79.89 OTHER SPECIFIED ABNORMAL FINDINGS OF BLOOD CHEMISTRY: ICD-10-CM

## 2023-05-04 PROCEDURE — 99214 OFFICE O/P EST MOD 30 MIN: CPT

## 2023-05-04 NOTE — HISTORY OF PRESENT ILLNESS
[Disease: _____________________] : Disease: [unfilled] [T: ___] : T[unfilled] [N: ___] : N[unfilled] [M: ___] : M[unfilled] [AJCC Stage: ____] : AJCC Stage: [unfilled] [Date: ____________] : Patient's last distress assessment performed on [unfilled]. [2 - Distress Level] : Distress Level: 2 [Patient given social work contact information and resource sheet] : Patient was given social work contact information and resource sheet [de-identified] : Left breast cancer at age 62\par 8/15/22 Mammogram revealed a 1.1 cm nodular density in the left upper outer quadrant posterior depth. Stable focal area of asymmetric density is seen in the right central breast middle depth. Stable grouping of calcifications is noted in the left upper inner quadrant anterior depth. There is no evidence for skin thickening or nipple retraction.\par 8/15/22 Bilateral breast ultrasound: Right breast, 11-12:00 position 2 to 3 cm from the nipple a stable 7 mm cyst is noted. Left breast- at the 2:00 position 8 cm from the nipple a 1 cm slightly lobulated hypoechoic lesion is seen. This corresponds to the mammographic finding in this region. Ultrasound-guided core biopsy is recommended. BI-RADS 4B. \par 8/26/2022 US guided left breast 3:00 core biopsy: A ribbon shaped marking clip was placed. 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 [place] score 6/9 (3+1+1), ER/[place] 99%, HER-s negative. Invasive tumor measures at least 9 mm. DCIS not present. These results are concordant. \par 09/06/22 Genetic testing with [facility] 47 gene panel negative for pathogenic variants\par 09/10/22 Bilateral breast MRI: Right breast: There are scattered enhancing nonspecific foci. There is no suspicious enhancement in the right breast.n the central slightly superior right breast seen on sequence 3-2 02 image 61 a benign well-defined T2 bright 8 mm nodule is seen with benign persistent type kinetics corresponding to the mammographically stable nodule dating to 2015 . Benign corresponding finding was reported on prior ultrasounds.\par Left breast: There are scattered enhancing nonspecific foci.In the posterior outer left breast corresponding to the biopsy-proven cancer a suspicious irregular approximately 1.5 cm mass is seen in association with susceptibility artifact for which appropriate surgical and oncologic management is recommended.\par Axilla: There is no significant axillary or internal mammary lymphadenopathy.\par 11/1/22 Left lumpectomy and SLN with Dr. Palleschi and bilateral oncoplastic lift with Dr. Sheppard. \par Pathology revealed: Left axillary sentinel lymph nodes x 2, no tumor identified. Left breast 3:00, lumpectomy: Solid papillary carcinoma with invasion, Grade 2; Tumor size 1.7 cm (pT1c) Margins of resection, no tumor identified. \par Oncotype Dx recurrence score is 0 which predicts a 3% risk of distant recurrence at 9 years and <1% predicted benefit from chemotherapy\par 01/03/23 - 01/25/23 Adjuvant radiation with 4240 cGY in 16 fractions with Dr. Giraldo\par 02/2023 Anastrozole started\par  [de-identified] : 1.7 cm IDC, SBR 6/9, ER/[place] 99%, HER-2 negative, 0/2 SLN, Oncotype recurrence score = 0 (RR 3%) [de-identified] : Sarita started  anastrozole in 02/2023 and is overall tolerating it well with no arthralgias or vaginal dryness.\par She is  and has two children ages 25 and 22 who are both living at home and working. Her younger daughter is applying to law school. \par \par HEALTH MAINTENANCE:\par PCP: Dr. Zack Napoles\par GYN: Dr. Joby Bellamy\par Mammogram and breast ultrasound: 8/15/22\par Pap Smear: 7/5/22 negative\par Bone density: 7/18/22 showed T scores of 0.4 in spine, -0.5 in femoral neck and 0.1 in total hip\par Colonoscopy: 7/2022 with one benign polyp, next in 3-5 years\par Genetics: 09/06/22 [facility] 47 gene panel negative for pathogenic variants\par

## 2023-05-04 NOTE — PHYSICAL EXAM
[Fully active, able to carry on all pre-disease performance without restriction] : Status 0 - Fully active, able to carry on all pre-disease performance without restriction [Normal] : affect appropriate [de-identified] : Well healing incisions left breast and axilla

## 2023-05-19 ENCOUNTER — APPOINTMENT (OUTPATIENT)
Dept: ULTRASOUND IMAGING | Facility: HOSPITAL | Age: 63
End: 2023-05-19
Payer: COMMERCIAL

## 2023-05-19 ENCOUNTER — OUTPATIENT (OUTPATIENT)
Dept: OUTPATIENT SERVICES | Facility: HOSPITAL | Age: 63
LOS: 1 days | End: 2023-05-19
Payer: COMMERCIAL

## 2023-05-19 DIAGNOSIS — Z00.8 ENCOUNTER FOR OTHER GENERAL EXAMINATION: ICD-10-CM

## 2023-05-19 DIAGNOSIS — E89.0 POSTPROCEDURAL HYPOTHYROIDISM: Chronic | ICD-10-CM

## 2023-05-19 PROCEDURE — 76700 US EXAM ABDOM COMPLETE: CPT | Mod: 26

## 2023-05-19 PROCEDURE — 76700 US EXAM ABDOM COMPLETE: CPT

## 2023-05-23 ENCOUNTER — NON-APPOINTMENT (OUTPATIENT)
Age: 63
End: 2023-05-23

## 2023-05-26 ENCOUNTER — APPOINTMENT (OUTPATIENT)
Dept: PLASTIC SURGERY | Facility: CLINIC | Age: 63
End: 2023-05-26
Payer: COMMERCIAL

## 2023-05-26 VITALS
SYSTOLIC BLOOD PRESSURE: 149 MMHG | HEART RATE: 74 BPM | TEMPERATURE: 97.9 F | DIASTOLIC BLOOD PRESSURE: 91 MMHG | OXYGEN SATURATION: 98 % | HEIGHT: 65 IN | BODY MASS INDEX: 31.16 KG/M2 | WEIGHT: 187 LBS

## 2023-05-26 PROCEDURE — 99213 OFFICE O/P EST LOW 20 MIN: CPT

## 2023-05-26 NOTE — ASSESSMENT
[FreeTextEntry1] : History of left breast invasive carcinoma, Stage I (11/2022)\par No evidence of disease recurrence on Clinical breast exam. \par \par 1.  Annual bilateral diagnostic mammogram and breast ultrasound due 8/2023; Rx provided.\par 2. Follow up office visit due 11/2023\par 3. Advise monthly self breast examinations and advised her to contact me if she has any concerns. \par 4.  List of teressa ramirez's provided.

## 2023-05-26 NOTE — PHYSICAL EXAM
[Normocephalic] : normocephalic [Atraumatic] : atraumatic [EOMI] : extra ocular movement intact [Sclera nonicteric] : sclera nonicteric [Supple] : supple [No Supraclavicular Adenopathy] : no supraclavicular adenopathy [No Cervical Adenopathy] : no cervical adenopathy [No Thyromegaly] : no thyromegaly [Examined in the supine and seated position] : examined in the supine and seated position [No dominant masses] : no dominant masses in right breast  [No dominant masses] : no dominant masses left breast [No Nipple Retraction] : no left nipple retraction [No Nipple Discharge] : no left nipple discharge [No Axillary Lymphadenopathy] : no left axillary lymphadenopathy [No Edema] : no edema [No Rashes] : no rashes [No Ulceration] : no ulceration [de-identified] : S/P bilateral oncoplastic reduction, left lumpectomy, sentinel node biopsy. Incisions healed well. Right breast is larger than her left.

## 2023-05-26 NOTE — HISTORY OF PRESENT ILLNESS
[FreeTextEntry1] : Patient is a 63 year old female here today for a follow up visit. \par She has a history of thyroid cancer (dx in 2009), treated with total thyroidectomy. \par Family history of breast cancer in her paternal grandmother (dx ~ 87 years old) and paternal cousin (dx ~40 years old). \par Family history of pancreatic cancer in her maternal uncle, diagnosed at age 70. \par 2 of the patient's sisters have had genetic testing, both BRCA -. \par 8/15/2022 Bilateral mammogram: [facility] Lifetime Risk: 9.1%\par A 1.1 cm dense nodular density is noted in the left upper outer quadrant posterior depth for which ultrasound examination is to follow.\par Stable focal area of asymmetric density is seen in the right central breast middle depth. Stable grouping of calcifications is noted in the left upper inner quadrant anterior depth. There is no evidence for skin thickening or nipple retraction.\par Bilateral breast ultrasound: Right breast- at the 11-12:00 position 2 to 3 cm from the nipple a stable 7 mm cyst is noted. Left breast- at the 2:00 position 8 cm from the nipple a 1 cm slightly lobulated hypoechoic lesion is seen. This corresponds to the mammographic finding in this region. Ultrasound-guided core biopsy is recommended. Bi-rads 4B. \par 8/26/2022 US guided left breast 3:00 (8cmFN) core biopsy: A ribbon shaped marking clip was placed. 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 Hull score 6/9 (3+1+1) in this limited material. Invasive tumor measures at least 9 mm. DCIS not present. These results are concordant. Surgical/oncologic management is recommended and consideration to presurgical MRI. ER+/NE+/Her-2 negative. \par 09/10/22 Bilateral breast MRI: Right breast: There are scattered enhancing nonspecific foci. There is no suspicious enhancement in the right breast.n the central slightly superior right breast seen on sequence 3-2 02 image 61 a benign well-defined T2 bright 8mm nodule is seen with benign persistent type kinetics corresponding to the mammographically stable nodule dating to 2015 . Benign corresponding finding was reported on prior ultrasounds.\par Left breast: There are scattered enhancing nonspecific foci.In the posterior outer left breast corresponding to the biopsy-proven cancer a suspicious irregular approximately 1.5 cm mass is seen in association with susceptibility artifact for which appropriate surgical and oncologic management is recommended.\par Axilla: There is no significant axillary or internal mammary lymphadenopathy.\par Surgical or oncologic managements recommended. Bi-rads 6 \par 10/31/22 Slide consult: Pathology revealed In this limited sample, the findings are most suggestive of solid\par papillary carcinoma. Frankly invasive cannot be excluded. Definitive characterization is deferred to the resection specimen. ER+/NE+/HER2-\par 11/1/2022 s/p left 3:00 FAUSTINO  localization wide excision lumpectomy of invasive ductal breast cancer with left axillary sentinel lymph node biopsy and bilateral oncoplastic lift with Dr. Sheppard. \par Pathology revealed: Left axillary sentinel lymph nodes x 2, no tumor identified; Right breast tissue, excision: Skin, no tumor identified; Left breast 3:00, lumpectomy: Solid papillary carcinoma with invasion, Grade 2; Tumor size 1.7 cm (pT1c) Margins of resection, no tumor identified. Left breast 3:00, scar, core biopsy: Skin with fibrosis, no tumor identified; Left breast skin tissue, excision: Skin, no tumor identified\par Plastic Surgeon: Dr. Sheppard; She had an I&D of the left hematoma. She last saw Blank GARCIA 12/1/2022. \par Oncotype Dx recurrence score: 0\par Rad Onc: Dr. Krista Giraldo; she completed XRT 1/25/2023, last saw her 4/5/2023\par Med Onc: Dr. Ponce. She started Anastrozole 2/2023, last saw her 5/4/2023 \par She notes occasional twinges of pain left breast.  She denies any breast masses or nipple discharge.\par \par

## 2023-05-26 NOTE — CONSULT LETTER
[Dear  ___] : Dear  [unfilled], [Courtesy Letter:] : I had the pleasure of seeing your patient, [unfilled], in my office today. [Please see my note below.] : Please see my note below. [Consult Closing:] : Thank you very much for allowing me to participate in the care of this patient.  If you have any questions, please do not hesitate to contact me. [Sincerely,] : Sincerely, [DrAna  ___] : Dr. MIRANDA [FreeTextEntry3] : Shannon Mays, RPA-C\par Breast Surgery\par [address]\par [address]\[place] [address]\par (Phone) [phone]\par (Fax) [phone]

## 2023-06-11 ENCOUNTER — RX RENEWAL (OUTPATIENT)
Age: 63
End: 2023-06-11

## 2023-08-25 ENCOUNTER — OUTPATIENT (OUTPATIENT)
Dept: OUTPATIENT SERVICES | Facility: HOSPITAL | Age: 63
LOS: 1 days | End: 2023-08-25
Payer: COMMERCIAL

## 2023-08-25 ENCOUNTER — RESULT REVIEW (OUTPATIENT)
Age: 63
End: 2023-08-25

## 2023-08-25 ENCOUNTER — APPOINTMENT (OUTPATIENT)
Dept: MAMMOGRAPHY | Facility: HOSPITAL | Age: 63
End: 2023-08-25
Payer: COMMERCIAL

## 2023-08-25 ENCOUNTER — APPOINTMENT (OUTPATIENT)
Dept: ULTRASOUND IMAGING | Facility: HOSPITAL | Age: 63
End: 2023-08-25

## 2023-08-25 DIAGNOSIS — C50.912 MALIGNANT NEOPLASM OF UNSPECIFIED SITE OF LEFT FEMALE BREAST: ICD-10-CM

## 2023-08-25 DIAGNOSIS — E89.0 POSTPROCEDURAL HYPOTHYROIDISM: Chronic | ICD-10-CM

## 2023-08-25 PROCEDURE — G0279: CPT

## 2023-08-25 PROCEDURE — G0279: CPT | Mod: 26

## 2023-08-25 PROCEDURE — 76641 ULTRASOUND BREAST COMPLETE: CPT

## 2023-08-25 PROCEDURE — 77066 DX MAMMO INCL CAD BI: CPT

## 2023-08-25 PROCEDURE — 77066 DX MAMMO INCL CAD BI: CPT | Mod: 26

## 2023-08-25 PROCEDURE — 76641 ULTRASOUND BREAST COMPLETE: CPT | Mod: 26,50

## 2023-09-19 ENCOUNTER — OUTPATIENT (OUTPATIENT)
Dept: OUTPATIENT SERVICES | Facility: HOSPITAL | Age: 63
LOS: 1 days | End: 2023-09-19
Payer: COMMERCIAL

## 2023-09-19 ENCOUNTER — APPOINTMENT (OUTPATIENT)
Dept: ULTRASOUND IMAGING | Facility: HOSPITAL | Age: 63
End: 2023-09-19
Payer: COMMERCIAL

## 2023-09-19 DIAGNOSIS — E89.0 POSTPROCEDURAL HYPOTHYROIDISM: Chronic | ICD-10-CM

## 2023-09-19 DIAGNOSIS — Z00.8 ENCOUNTER FOR OTHER GENERAL EXAMINATION: ICD-10-CM

## 2023-09-19 PROCEDURE — 76536 US EXAM OF HEAD AND NECK: CPT | Mod: 26

## 2023-09-19 PROCEDURE — 76536 US EXAM OF HEAD AND NECK: CPT

## 2023-10-01 PROBLEM — Z92.3 HISTORY OF RADIATION THERAPY: Status: RESOLVED | Noted: 2023-01-04 | Resolved: 2023-10-01

## 2023-10-22 ENCOUNTER — NON-APPOINTMENT (OUTPATIENT)
Age: 63
End: 2023-10-22

## 2023-11-10 ENCOUNTER — RX RENEWAL (OUTPATIENT)
Age: 63
End: 2023-11-10

## 2023-11-14 ENCOUNTER — OUTPATIENT (OUTPATIENT)
Dept: OUTPATIENT SERVICES | Facility: HOSPITAL | Age: 63
LOS: 1 days | Discharge: HOME | End: 2023-11-14

## 2023-11-14 DIAGNOSIS — E89.0 POSTPROCEDURAL HYPOTHYROIDISM: Chronic | ICD-10-CM

## 2023-11-14 DIAGNOSIS — C50.912 MALIGNANT NEOPLASM OF UNSPECIFIED SITE OF LEFT FEMALE BREAST: ICD-10-CM

## 2023-11-21 ENCOUNTER — APPOINTMENT (OUTPATIENT)
Dept: PLASTIC SURGERY | Facility: CLINIC | Age: 63
End: 2023-11-21
Payer: COMMERCIAL

## 2023-11-21 VITALS
BODY MASS INDEX: 31.16 KG/M2 | HEIGHT: 65 IN | OXYGEN SATURATION: 96 % | DIASTOLIC BLOOD PRESSURE: 86 MMHG | HEART RATE: 77 BPM | SYSTOLIC BLOOD PRESSURE: 133 MMHG | WEIGHT: 187 LBS

## 2023-11-21 PROCEDURE — 99213 OFFICE O/P EST LOW 20 MIN: CPT

## 2023-11-21 RX ORDER — CLOBETASOL PROPIONATE 0.5 MG/G
0.05 OINTMENT TOPICAL TWICE DAILY
Qty: 1 | Refills: 3 | Status: DISCONTINUED | COMMUNITY
Start: 2017-10-12 | End: 2023-11-21

## 2023-11-22 DIAGNOSIS — B37.31 ACUTE CANDIDIASIS OF VULVA AND VAGINA: ICD-10-CM

## 2023-11-22 RX ORDER — FLUCONAZOLE 200 MG/1
200 TABLET ORAL
Qty: 3 | Refills: 3 | Status: ACTIVE | COMMUNITY
Start: 2023-11-22 | End: 1900-01-01

## 2023-11-27 ENCOUNTER — APPOINTMENT (OUTPATIENT)
Dept: OBGYN | Facility: CLINIC | Age: 63
End: 2023-11-27
Payer: COMMERCIAL

## 2023-11-27 VITALS
HEART RATE: 92 BPM | SYSTOLIC BLOOD PRESSURE: 128 MMHG | BODY MASS INDEX: 28.93 KG/M2 | OXYGEN SATURATION: 98 % | HEIGHT: 66 IN | TEMPERATURE: 97.4 F | WEIGHT: 180 LBS | DIASTOLIC BLOOD PRESSURE: 80 MMHG

## 2023-11-27 DIAGNOSIS — Z01.419 ENCOUNTER FOR GYNECOLOGICAL EXAMINATION (GENERAL) (ROUTINE) W/OUT ABNORMAL FINDINGS: ICD-10-CM

## 2023-11-27 PROCEDURE — 99396 PREV VISIT EST AGE 40-64: CPT

## 2023-11-28 ENCOUNTER — APPOINTMENT (OUTPATIENT)
Dept: HEMATOLOGY ONCOLOGY | Facility: CLINIC | Age: 63
End: 2023-11-28
Payer: COMMERCIAL

## 2023-11-28 VITALS
SYSTOLIC BLOOD PRESSURE: 141 MMHG | DIASTOLIC BLOOD PRESSURE: 89 MMHG | BODY MASS INDEX: 30.96 KG/M2 | WEIGHT: 191.8 LBS | RESPIRATION RATE: 17 BRPM | OXYGEN SATURATION: 97 % | TEMPERATURE: 98.2 F | HEART RATE: 73 BPM

## 2023-11-28 PROCEDURE — 99214 OFFICE O/P EST MOD 30 MIN: CPT

## 2023-12-04 ENCOUNTER — APPOINTMENT (OUTPATIENT)
Dept: CARDIOLOGY | Facility: CLINIC | Age: 63
End: 2023-12-04

## 2023-12-08 LAB
CYTOLOGY CVX/VAG DOC THIN PREP: ABNORMAL
HPV HIGH+LOW RISK DNA PNL CVX: NOT DETECTED

## 2024-01-06 ENCOUNTER — NON-APPOINTMENT (OUTPATIENT)
Age: 64
End: 2024-01-06

## 2024-02-20 ENCOUNTER — RX RENEWAL (OUTPATIENT)
Age: 64
End: 2024-02-20

## 2024-02-20 RX ORDER — IRBESARTAN AND HYDROCHLOROTHIAZIDE 150; 12.5 MG/1; MG/1
150-12.5 TABLET ORAL
Qty: 180 | Refills: 0 | Status: ACTIVE | COMMUNITY
Start: 2022-06-16 | End: 1900-01-01

## 2024-02-27 ENCOUNTER — RX RENEWAL (OUTPATIENT)
Age: 64
End: 2024-02-27

## 2024-02-27 RX ORDER — ANASTROZOLE TABLETS 1 MG/1
1 TABLET ORAL
Qty: 90 | Refills: 3 | Status: ACTIVE | COMMUNITY
Start: 2022-12-27 | End: 1900-01-01

## 2024-03-11 NOTE — H&P PST ADULT - PROBLEM SELECTOR PLAN 2
Yes pt instructed to continue medications as prescribed and take irbesartan with a sip of water on the morning of the surgery

## 2024-03-18 ENCOUNTER — APPOINTMENT (OUTPATIENT)
Dept: PLASTIC SURGERY | Facility: CLINIC | Age: 64
End: 2024-03-18

## 2024-03-28 ENCOUNTER — NON-APPOINTMENT (OUTPATIENT)
Age: 64
End: 2024-03-28

## 2024-03-29 ENCOUNTER — APPOINTMENT (OUTPATIENT)
Dept: PLASTIC SURGERY | Facility: CLINIC | Age: 64
End: 2024-03-29
Payer: COMMERCIAL

## 2024-03-29 VITALS
TEMPERATURE: 97.7 F | BODY MASS INDEX: 30.7 KG/M2 | SYSTOLIC BLOOD PRESSURE: 145 MMHG | OXYGEN SATURATION: 97 % | HEART RATE: 84 BPM | HEIGHT: 66 IN | DIASTOLIC BLOOD PRESSURE: 88 MMHG | WEIGHT: 191 LBS

## 2024-03-29 DIAGNOSIS — C50.912 MALIGNANT NEOPLASM OF UNSPECIFIED SITE OF LEFT FEMALE BREAST: ICD-10-CM

## 2024-03-29 PROCEDURE — 99213 OFFICE O/P EST LOW 20 MIN: CPT

## 2024-03-29 NOTE — HISTORY OF PRESENT ILLNESS
[FreeTextEntry1] : Patient is a 64 year old female here today for a follow up visit.  She has a history of thyroid cancer (dx in 2009), treated with total thyroidectomy.  Family history of breast cancer in her paternal grandmother (dx ~ 87 years old) and paternal cousin (dx ~40 years old).  Family history of pancreatic cancer in her maternal uncle, diagnosed at age 70.  2 of the patient's sisters have had genetic testing, both BRCA -.  11/1/2022 s/p left 3:00 FAUSTINO  localization wide excision lumpectomy of invasive ductal breast cancer with left axillary sentinel lymph node biopsy and bilateral oncoplastic lift with Dr. Sheppard.  Pathology revealed: Left axillary sentinel lymph nodes x 2, no tumor identified; Right breast tissue, excision: Skin, no tumor identified; Left breast 3:00, lumpectomy: Solid papillary carcinoma with invasion, Grade 2; Tumor size 1.7 cm (pT1c) Margins of resection, no tumor identified. Left breast 3:00, scar, core biopsy: Skin with fibrosis, no tumor identified; Left breast skin tissue, excision: Skin, no tumor identified Plastic Surgeon: Dr. Sheppard; She had an I&D of the left hematoma. She last saw Blank GARCIA 12/1/2022.  Oncotype Dx recurrence score: 0 Rad Onc: Dr. Krista Giraldo; she completed XRT 1/25/2023, last saw her 4/5/2023 Med Onc: Dr. Ponce. She started Anastrozole 2/2023, last saw her 11/28/2023. Her next appointment is 5/21/2024. 8/25/2023 Bilateral mammogram: There are scattered areas of fibroglandular density. No suspicious mass or suspicious microcalcifications are seen in either breast to suggest malignancy. Stable focal area of asymmetric density is seen in the right upper outer quadrant. Postsurgical and posttreatment changes are noted in the left breast. Benign dermal calcifications are seen bilaterally. Bilateral ultrasound: At the right 12:00 position 3 cm from the nipple a stable 6 mm well-circumscribed ovoid hypoechoic lesion is seen. This corresponds to the stable mammographic finding in this region. The surgical scar is demonstrated at the left 3 to 4:00 position 7 cm from the nipple. BI-RADS 2 Up to date with MDs m She denies any current breast concerns

## 2024-03-29 NOTE — PHYSICAL EXAM
[de-identified] : S/P bilateral oncoplastic reduction, left lumpectomy, sentinel node biopsy. Incisions healed well. Right breast is larger than her left.  [de-identified] : Post-XRT skin thickening.

## 2024-03-29 NOTE — ASSESSMENT
[FreeTextEntry1] : History of left breast invasive carcinoma, Stage I (11/2022) No evidence of disease recurrence on Clinical breast exam.  1. Annual bilateral diagnostic mammogram and breast ultrasound due 8/2024 2. Follow up office visit due 9/2024 3. Advise monthly self breast examinations and advised her to contact me if she has any concerns.

## 2024-03-29 NOTE — CONSULT LETTER
[FreeTextEntry3] : Shannon Mays, RPA-C Breast Surgery [address] [place] [address], [place] [address] (Phone) [phone] (Fax) [phone]

## 2024-05-14 ENCOUNTER — OUTPATIENT (OUTPATIENT)
Dept: OUTPATIENT SERVICES | Facility: HOSPITAL | Age: 64
LOS: 1 days | Discharge: HOME | End: 2024-05-14

## 2024-05-14 DIAGNOSIS — C50.912 MALIGNANT NEOPLASM OF UNSPECIFIED SITE OF LEFT FEMALE BREAST: ICD-10-CM

## 2024-05-14 DIAGNOSIS — E89.0 POSTPROCEDURAL HYPOTHYROIDISM: Chronic | ICD-10-CM

## 2024-05-21 ENCOUNTER — APPOINTMENT (OUTPATIENT)
Dept: HEMATOLOGY ONCOLOGY | Facility: CLINIC | Age: 64
End: 2024-05-21

## 2024-06-27 ENCOUNTER — APPOINTMENT (OUTPATIENT)
Dept: HEMATOLOGY ONCOLOGY | Facility: CLINIC | Age: 64
End: 2024-06-27

## 2024-07-03 ENCOUNTER — APPOINTMENT (OUTPATIENT)
Dept: HEMATOLOGY ONCOLOGY | Facility: CLINIC | Age: 64
End: 2024-07-03

## 2024-08-04 ENCOUNTER — OUTPATIENT (OUTPATIENT)
Dept: OUTPATIENT SERVICES | Facility: HOSPITAL | Age: 64
LOS: 1 days | Discharge: HOME | End: 2024-08-04

## 2024-08-04 DIAGNOSIS — E89.0 POSTPROCEDURAL HYPOTHYROIDISM: Chronic | ICD-10-CM

## 2024-08-04 DIAGNOSIS — C50.912 MALIGNANT NEOPLASM OF UNSPECIFIED SITE OF LEFT FEMALE BREAST: ICD-10-CM

## 2024-08-07 ENCOUNTER — NON-APPOINTMENT (OUTPATIENT)
Age: 64
End: 2024-08-07

## 2024-08-08 ENCOUNTER — APPOINTMENT (OUTPATIENT)
Dept: HEMATOLOGY ONCOLOGY | Facility: CLINIC | Age: 64
End: 2024-08-08

## 2024-08-08 PROCEDURE — 99214 OFFICE O/P EST MOD 30 MIN: CPT

## 2024-08-08 PROCEDURE — G2211 COMPLEX E/M VISIT ADD ON: CPT | Mod: NC

## 2024-08-08 NOTE — HISTORY OF PRESENT ILLNESS
[Disease: _____________________] : Disease: [unfilled] [T: ___] : T[unfilled] [N: ___] : N[unfilled] [M: ___] : M[unfilled] [AJCC Stage: ____] : AJCC Stage: [unfilled] [de-identified] : Left breast cancer at age 62 8/15/22 Mammogram revealed a 1.1 cm nodular density in the left upper outer quadrant posterior depth. Stable focal area of asymmetric density is seen in the right central breast middle depth. Stable grouping of calcifications is noted in the left upper inner quadrant anterior depth. There is no evidence for skin thickening or nipple retraction. 8/15/22 Bilateral breast ultrasound: Right breast, 11-12:00 position 2 to 3 cm from the nipple a stable 7 mm cyst is noted. Left breast- at the 2:00 position 8 cm from the nipple a 1 cm slightly lobulated hypoechoic lesion is seen. This corresponds to the mammographic finding in this region. Ultrasound-guided core biopsy is recommended. BI-RADS 4B.  8/26/2022 US guided left breast 3:00 core biopsy: A ribbon shaped marking clip was placed. 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 Kate score 6/9 (3+1+1), ER/NE 99%, HER-s negative. Invasive tumor measures at least 9 mm. DCIS not present. These results are concordant.  09/06/22 Genetic testing with [facility] 47 gene panel negative for pathogenic variants 09/10/22 Bilateral breast MRI: Right breast: There are scattered enhancing nonspecific foci. There is no suspicious enhancement in the right breast.n the central slightly superior right breast seen on sequence 3-2 02 image 61 a benign well-defined T2 bright 8 mm nodule is seen with benign persistent type kinetics corresponding to the mammographically stable nodule dating to 2015 . Benign corresponding finding was reported on prior ultrasounds. Left breast: There are scattered enhancing nonspecific foci.In the posterior outer left breast corresponding to the biopsy-proven cancer a suspicious irregular approximately 1.5 cm mass is seen in association with susceptibility artifact for which appropriate surgical and oncologic management is recommended. Axilla: There is no significant axillary or internal mammary lymphadenopathy. 11/1/22 Left lumpectomy and SLN with Dr. Palleschi and bilateral oncoplastic lift with Dr. Sheppard.  Pathology revealed: Left axillary sentinel lymph nodes x 2, no tumor identified. Left breast 3:00, lumpectomy: Solid papillary carcinoma with invasion, Grade 2; Tumor size 1.7 cm (pT1c) Margins of resection, no tumor identified.  Oncotype Dx recurrence score is 0 which predicts a 3% risk of distant recurrence at 9 years and <1% predicted benefit from chemotherapy 01/03/23 - 01/25/23 Adjuvant radiation with 4240 cGY in 16 fractions with Dr. Giraldo 02/2023 Anastrozole started  [de-identified] : 1.7 cm IDC, SBR 6/9, ER/[place] 99%, HER-2 negative, 0/2 SLN, Oncotype recurrence score = 0 (RR 3%) [de-identified] : Sarita started anastrozole in 02/2023 and is overall tolerating it well with no arthralgias or vaginal dryness.  She has observed an improvement in hair thinning since starting to use Nutrafol. She is  and has two children a daughter age 25 who is living at home and working. Her younger daughter is now in law school in [facility] [employer].  HEALTH MAINTENANCE: PCP: Dr. Zack Napoles GYN: Dr. Joby Bellamy Mammogram and breast ultrasound: 8/25/23 BI-RADS 2  Pap Smear: 7/5/22 negative, 11/27/2023 negative Bone density: 7/18/22 showed T scores of 0.4 in spine, -0.5 in femoral neck and 0.1 in total hip Colonoscopy: 7/2022 with one benign polyp, next in 3-5 years Genetics: 09/06/22 [facility] 47 gene panel negative for pathogenic variants

## 2024-08-08 NOTE — PHYSICAL EXAM
[Fully active, able to carry on all pre-disease performance without restriction] : Status 0 - Fully active, able to carry on all pre-disease performance without restriction [Normal] : affect appropriate [de-identified] : Left breast post radiation changes

## 2024-09-05 ENCOUNTER — RESULT REVIEW (OUTPATIENT)
Age: 64
End: 2024-09-05

## 2024-09-05 ENCOUNTER — APPOINTMENT (OUTPATIENT)
Dept: RADIOLOGY | Facility: HOSPITAL | Age: 64
End: 2024-09-05
Payer: COMMERCIAL

## 2024-09-05 ENCOUNTER — APPOINTMENT (OUTPATIENT)
Dept: ULTRASOUND IMAGING | Facility: HOSPITAL | Age: 64
End: 2024-09-05
Payer: COMMERCIAL

## 2024-09-05 ENCOUNTER — OUTPATIENT (OUTPATIENT)
Dept: OUTPATIENT SERVICES | Facility: HOSPITAL | Age: 64
LOS: 1 days | End: 2024-09-05
Payer: COMMERCIAL

## 2024-09-05 ENCOUNTER — APPOINTMENT (OUTPATIENT)
Dept: MAMMOGRAPHY | Facility: HOSPITAL | Age: 64
End: 2024-09-05
Payer: COMMERCIAL

## 2024-09-05 DIAGNOSIS — C50.912 MALIGNANT NEOPLASM OF UNSPECIFIED SITE OF LEFT FEMALE BREAST: ICD-10-CM

## 2024-09-05 DIAGNOSIS — E89.0 POSTPROCEDURAL HYPOTHYROIDISM: Chronic | ICD-10-CM

## 2024-09-05 PROCEDURE — G0279: CPT

## 2024-09-05 PROCEDURE — 77085 DXA BONE DENSITY AXL VRT FX: CPT | Mod: 26

## 2024-09-05 PROCEDURE — G0279: CPT | Mod: 26

## 2024-09-05 PROCEDURE — 77066 DX MAMMO INCL CAD BI: CPT | Mod: 26

## 2024-09-05 PROCEDURE — 76641 ULTRASOUND BREAST COMPLETE: CPT | Mod: 26,50

## 2024-09-05 PROCEDURE — 77085 DXA BONE DENSITY AXL VRT FX: CPT

## 2024-09-05 PROCEDURE — 77066 DX MAMMO INCL CAD BI: CPT

## 2024-09-05 PROCEDURE — 76641 ULTRASOUND BREAST COMPLETE: CPT

## 2024-09-09 ENCOUNTER — APPOINTMENT (OUTPATIENT)
Dept: PLASTIC SURGERY | Facility: CLINIC | Age: 64
End: 2024-09-09
Payer: COMMERCIAL

## 2024-09-09 VITALS
TEMPERATURE: 97.9 F | OXYGEN SATURATION: 96 % | HEART RATE: 76 BPM | BODY MASS INDEX: 31.5 KG/M2 | WEIGHT: 196 LBS | HEIGHT: 66 IN | SYSTOLIC BLOOD PRESSURE: 150 MMHG | DIASTOLIC BLOOD PRESSURE: 79 MMHG

## 2024-09-09 DIAGNOSIS — C50.912 MALIGNANT NEOPLASM OF UNSPECIFIED SITE OF LEFT FEMALE BREAST: ICD-10-CM

## 2024-09-09 PROCEDURE — 99213 OFFICE O/P EST LOW 20 MIN: CPT

## 2024-09-09 NOTE — PHYSICAL EXAM
[Normocephalic] : normocephalic [Atraumatic] : atraumatic [EOMI] : extra ocular movement intact [Sclera nonicteric] : sclera nonicteric [Supple] : supple [No Supraclavicular Adenopathy] : no supraclavicular adenopathy [Examined in the supine and seated position] : examined in the supine and seated position [Asymmetrical] : asymmetrical [No dominant masses] : no dominant masses in right breast  [No dominant masses] : no dominant masses left breast [No Nipple Retraction] : no left nipple retraction [No Nipple Discharge] : no left nipple discharge [No Axillary Lymphadenopathy] : no left axillary lymphadenopathy [No Edema] : no edema [No Rashes] : no rashes [No Ulceration] : no ulceration [No Cervical Adenopathy] : no cervical adenopathy [No Thyromegaly] : no thyromegaly [de-identified] : Post-XRT skin thickening. [de-identified] : S/P bilateral oncoplastic reduction, left lumpectomy, sentinel node biopsy. Incisions healed well.

## 2024-09-09 NOTE — ASSESSMENT
[FreeTextEntry1] : History of left breast invasive carcinoma, Stage I (11/2022) No evidence of disease recurrence on clinical breast exam.  1. Annual bilateral diagnostic mammogram and breast ultrasound due 9/2025 2. Follow up office visit due 3/2025. 3. Advise monthly self breast examinations and advised her to contact me if she has any concerns.

## 2024-09-09 NOTE — CONSULT LETTER
[Dear  ___] : Dear  [unfilled], [Courtesy Letter:] : I had the pleasure of seeing your patient, [unfilled], in my office today. [Please see my note below.] : Please see my note below. [Consult Closing:] : Thank you very much for allowing me to participate in the care of this patient.  If you have any questions, please do not hesitate to contact me. [Sincerely,] : Sincerely, [DrAna  ___] : Dr. MIRANDA [FreeTextEntry3] : Susan M. Palleschi, MD, FACS Division of Breast Surgery Director, Breast Surgery [address] [address] (Phone) [phone] (Fax) [phone]

## 2024-09-09 NOTE — HISTORY OF PRESENT ILLNESS
[FreeTextEntry1] : Patient is a 64 year old female here today for a follow up visit.  She has a history of thyroid cancer (dx in 2009), treated with total thyroidectomy.  Family history of breast cancer in her paternal grandmother (dx ~ 87 years old) and paternal cousin (dx ~40 years old).  Family history of pancreatic cancer in her maternal uncle, diagnosed at age 70.  2 of the patient's sisters have had genetic testing, both BRCA -.  9/6/22 [facility] genetic testing: negative 11/1/2022 s/p left 3:00 FAUSTINO  localization wide excision lumpectomy of invasive ductal breast cancer with left axillary sentinel lymph node biopsy and bilateral oncoplastic lift with Dr. Sheppard.  Pathology revealed: Left axillary sentinel lymph nodes x 2, no tumor identified; Right breast tissue, excision: Skin, no tumor identified; Left breast 3:00, lumpectomy: Solid papillary carcinoma with invasion, Grade 2; Tumor size 1.7 cm (pT1c) Margins of resection, no tumor identified. Left breast 3:00, scar, core biopsy: Skin with fibrosis, no tumor identified; Left breast skin tissue, excision: Skin, no tumor identified Plastic Surgeon: Dr. Sheppard; She had an I&D of the left hematoma. She last saw Blank GARCIA 12/1/2022.  Oncotype Dx recurrence score: 0 Rad Onc: Dr. Krista Giraldo; she completed XRT 1/25/2023, last saw her 4/5/2023 Med Onc: Dr. Ponce. She started Anastrozole 2/2023, last saw her 8/8/2024. 9/6/2024 Bilateral mammogram: here are scattered areas of fibroglandular density. No suspicious mass or suspicious microcalcifications are seen in either breast to suggest malignancy. Stable focal area of asymmetric density is seen in the right upper outer quadrant middle depth. Stable postsurgical and posttreatment changes are seen in the left breast. Stable benign dermal type calcifications are seen in both breasts. BREAST ARTERIAL CALCIFICATION (ASHLEY): Grade 0 - No vascular calcifications. Note: The absence of breast arterial calcification does not exclude cardiovascular disease. Management of cardiovascular risk factors should be based clinically. Bilateral ultrasound:  RIGHT BREAST: At the 12:00 position 3 cm from the nipple a stable 7 mm well-circumscribed ovoid hypoechoic lesion is seen. This corresponds to the stable mammographic finding in this region. LEFT BREAST: No discrete solid or cystic lesion is seen. The surgical scar is redemonstrated at the 3 to 4:00 position 7 cm from the nipple. BI-RADS 2 Up to date with Joycelyn goode She denies any current breast concerns

## 2024-09-09 NOTE — PHYSICAL EXAM
[Normocephalic] : normocephalic [Atraumatic] : atraumatic [EOMI] : extra ocular movement intact [Sclera nonicteric] : sclera nonicteric [Supple] : supple [No Supraclavicular Adenopathy] : no supraclavicular adenopathy [Examined in the supine and seated position] : examined in the supine and seated position [Asymmetrical] : asymmetrical [No dominant masses] : no dominant masses in right breast  [No dominant masses] : no dominant masses left breast [No Nipple Retraction] : no left nipple retraction [No Nipple Discharge] : no left nipple discharge [No Axillary Lymphadenopathy] : no left axillary lymphadenopathy [No Edema] : no edema [No Rashes] : no rashes [No Ulceration] : no ulceration [No Cervical Adenopathy] : no cervical adenopathy [No Thyromegaly] : no thyromegaly [de-identified] : Post-XRT skin thickening. [de-identified] : S/P bilateral oncoplastic reduction, left lumpectomy, sentinel node biopsy. Incisions healed well.

## 2024-09-09 NOTE — CONSULT LETTER
[Dear  ___] : Dear  [unfilled], [Courtesy Letter:] : I had the pleasure of seeing your patient, [unfilled], in my office today. [Please see my note below.] : Please see my note below. [Consult Closing:] : Thank you very much for allowing me to participate in the care of this patient.  If you have any questions, please do not hesitate to contact me. [Sincerely,] : Sincerely, [DrAna  ___] : Dr. MIRANDA [FreeTextEntry3] : Susan M. Palleschi, MD, FACS Division of Breast Surgery Director, Breast Surgery [address] [address] (Phone) [phone] (Fax) [phone]

## 2024-10-25 DIAGNOSIS — L29.2 PRURITUS VULVAE: ICD-10-CM

## 2024-10-25 RX ORDER — CLOTRIMAZOLE AND BETAMETHASONE DIPROPIONATE 10; .5 MG/G; MG/G
1-0.05 CREAM TOPICAL TWICE DAILY
Qty: 1 | Refills: 6 | Status: ACTIVE | COMMUNITY
Start: 2024-10-25 | End: 1900-01-01

## 2024-11-09 ENCOUNTER — NON-APPOINTMENT (OUTPATIENT)
Age: 64
End: 2024-11-09

## 2024-11-12 ENCOUNTER — APPOINTMENT (OUTPATIENT)
Dept: CT IMAGING | Facility: HOSPITAL | Age: 64
End: 2024-11-12
Payer: COMMERCIAL

## 2024-11-12 ENCOUNTER — OUTPATIENT (OUTPATIENT)
Dept: OUTPATIENT SERVICES | Facility: HOSPITAL | Age: 64
LOS: 1 days | End: 2024-11-12
Payer: COMMERCIAL

## 2024-11-12 DIAGNOSIS — E89.0 POSTPROCEDURAL HYPOTHYROIDISM: Chronic | ICD-10-CM

## 2024-11-12 DIAGNOSIS — Z00.8 ENCOUNTER FOR OTHER GENERAL EXAMINATION: ICD-10-CM

## 2024-11-12 PROCEDURE — 70450 CT HEAD/BRAIN W/O DYE: CPT | Mod: 26

## 2024-11-12 PROCEDURE — 70450 CT HEAD/BRAIN W/O DYE: CPT

## 2024-12-02 ENCOUNTER — APPOINTMENT (OUTPATIENT)
Dept: OBGYN | Facility: CLINIC | Age: 64
End: 2024-12-02
Payer: COMMERCIAL

## 2024-12-02 VITALS
HEIGHT: 66 IN | HEART RATE: 90 BPM | DIASTOLIC BLOOD PRESSURE: 80 MMHG | WEIGHT: 185 LBS | OXYGEN SATURATION: 97 % | BODY MASS INDEX: 29.73 KG/M2 | TEMPERATURE: 97.6 F | SYSTOLIC BLOOD PRESSURE: 148 MMHG

## 2024-12-02 DIAGNOSIS — Z01.419 ENCOUNTER FOR GYNECOLOGICAL EXAMINATION (GENERAL) (ROUTINE) W/OUT ABNORMAL FINDINGS: ICD-10-CM

## 2024-12-02 DIAGNOSIS — C50.912 MALIGNANT NEOPLASM OF UNSPECIFIED SITE OF LEFT FEMALE BREAST: ICD-10-CM

## 2024-12-02 DIAGNOSIS — L29.2 PRURITUS VULVAE: ICD-10-CM

## 2024-12-02 PROCEDURE — 99396 PREV VISIT EST AGE 40-64: CPT

## 2024-12-02 RX ORDER — CLOBETASOL PROPIONATE 0.5 MG/G
0.05 OINTMENT TOPICAL TWICE DAILY
Qty: 1 | Refills: 3 | Status: ACTIVE | COMMUNITY
Start: 2024-12-02 | End: 1900-01-01

## 2024-12-03 LAB — HPV HIGH+LOW RISK DNA PNL CVX: NOT DETECTED

## 2024-12-04 LAB — CYTOLOGY CVX/VAG DOC THIN PREP: ABNORMAL

## 2024-12-13 ENCOUNTER — OUTPATIENT (OUTPATIENT)
Dept: OUTPATIENT SERVICES | Facility: HOSPITAL | Age: 64
LOS: 1 days | End: 2024-12-13
Payer: COMMERCIAL

## 2024-12-13 ENCOUNTER — APPOINTMENT (OUTPATIENT)
Dept: RADIOLOGY | Facility: HOSPITAL | Age: 64
End: 2024-12-13
Payer: COMMERCIAL

## 2024-12-13 ENCOUNTER — APPOINTMENT (OUTPATIENT)
Dept: ULTRASOUND IMAGING | Facility: HOSPITAL | Age: 64
End: 2024-12-13
Payer: COMMERCIAL

## 2024-12-13 DIAGNOSIS — E89.0 POSTPROCEDURAL HYPOTHYROIDISM: Chronic | ICD-10-CM

## 2024-12-13 DIAGNOSIS — Z00.8 ENCOUNTER FOR OTHER GENERAL EXAMINATION: ICD-10-CM

## 2024-12-13 PROCEDURE — 72100 X-RAY EXAM L-S SPINE 2/3 VWS: CPT | Mod: 26

## 2024-12-13 PROCEDURE — 93970 EXTREMITY STUDY: CPT | Mod: 26

## 2024-12-13 PROCEDURE — 93970 EXTREMITY STUDY: CPT

## 2024-12-13 PROCEDURE — 72100 X-RAY EXAM L-S SPINE 2/3 VWS: CPT

## 2024-12-24 ENCOUNTER — NON-APPOINTMENT (OUTPATIENT)
Age: 64
End: 2024-12-24

## 2024-12-24 ENCOUNTER — APPOINTMENT (OUTPATIENT)
Dept: CARDIOLOGY | Facility: CLINIC | Age: 64
End: 2024-12-24
Payer: COMMERCIAL

## 2024-12-24 VITALS
DIASTOLIC BLOOD PRESSURE: 80 MMHG | SYSTOLIC BLOOD PRESSURE: 137 MMHG | HEIGHT: 66 IN | OXYGEN SATURATION: 98 % | WEIGHT: 190 LBS | HEART RATE: 71 BPM | BODY MASS INDEX: 30.53 KG/M2

## 2024-12-24 DIAGNOSIS — I10 ESSENTIAL (PRIMARY) HYPERTENSION: ICD-10-CM

## 2024-12-24 DIAGNOSIS — E78.5 HYPERLIPIDEMIA, UNSPECIFIED: ICD-10-CM

## 2024-12-24 PROCEDURE — 99214 OFFICE O/P EST MOD 30 MIN: CPT | Mod: 25

## 2024-12-24 PROCEDURE — 93000 ELECTROCARDIOGRAM COMPLETE: CPT

## 2025-01-24 ENCOUNTER — APPOINTMENT (OUTPATIENT)
Dept: CARDIOLOGY | Facility: CLINIC | Age: 65
End: 2025-01-24
Payer: COMMERCIAL

## 2025-01-24 PROCEDURE — 93306 TTE W/DOPPLER COMPLETE: CPT

## 2025-01-24 PROCEDURE — 93015 CV STRESS TEST SUPVJ I&R: CPT

## 2025-01-24 RX ORDER — AMLODIPINE BESYLATE 5 MG/1
5 TABLET ORAL DAILY
Qty: 90 | Refills: 1 | Status: ACTIVE | COMMUNITY
Start: 2025-01-24 | End: 1900-01-01

## 2025-02-21 ENCOUNTER — RX RENEWAL (OUTPATIENT)
Age: 65
End: 2025-02-21

## 2025-03-14 ENCOUNTER — OUTPATIENT (OUTPATIENT)
Dept: OUTPATIENT SERVICES | Facility: HOSPITAL | Age: 65
LOS: 1 days | Discharge: HOME | End: 2025-03-14

## 2025-03-14 ENCOUNTER — APPOINTMENT (OUTPATIENT)
Dept: PLASTIC SURGERY | Facility: CLINIC | Age: 65
End: 2025-03-14

## 2025-03-14 DIAGNOSIS — E89.0 POSTPROCEDURAL HYPOTHYROIDISM: Chronic | ICD-10-CM

## 2025-03-14 DIAGNOSIS — C50.912 MALIGNANT NEOPLASM OF UNSPECIFIED SITE OF LEFT FEMALE BREAST: ICD-10-CM

## 2025-03-18 ENCOUNTER — NON-APPOINTMENT (OUTPATIENT)
Age: 65
End: 2025-03-18

## 2025-03-18 ENCOUNTER — APPOINTMENT (OUTPATIENT)
Dept: HEMATOLOGY ONCOLOGY | Facility: CLINIC | Age: 65
End: 2025-03-18
Payer: COMMERCIAL

## 2025-03-18 VITALS
DIASTOLIC BLOOD PRESSURE: 82 MMHG | BODY MASS INDEX: 31.49 KG/M2 | OXYGEN SATURATION: 97 % | WEIGHT: 195.11 LBS | TEMPERATURE: 97.4 F | SYSTOLIC BLOOD PRESSURE: 136 MMHG | RESPIRATION RATE: 16 BRPM | HEART RATE: 78 BPM

## 2025-03-18 DIAGNOSIS — K76.0 FATTY (CHANGE OF) LIVER, NOT ELSEWHERE CLASSIFIED: ICD-10-CM

## 2025-03-18 DIAGNOSIS — C50.912 MALIGNANT NEOPLASM OF UNSPECIFIED SITE OF LEFT FEMALE BREAST: ICD-10-CM

## 2025-03-18 PROCEDURE — G2211 COMPLEX E/M VISIT ADD ON: CPT | Mod: NC

## 2025-03-18 PROCEDURE — 99214 OFFICE O/P EST MOD 30 MIN: CPT

## 2025-04-24 ENCOUNTER — NON-APPOINTMENT (OUTPATIENT)
Age: 65
End: 2025-04-24

## 2025-05-09 ENCOUNTER — NON-APPOINTMENT (OUTPATIENT)
Age: 65
End: 2025-05-09

## 2025-05-22 ENCOUNTER — RX RENEWAL (OUTPATIENT)
Age: 65
End: 2025-05-22

## 2025-07-21 ENCOUNTER — RX RENEWAL (OUTPATIENT)
Age: 65
End: 2025-07-21

## 2025-08-01 ENCOUNTER — APPOINTMENT (OUTPATIENT)
Dept: CARDIOLOGY | Facility: CLINIC | Age: 65
End: 2025-08-01
Payer: COMMERCIAL

## 2025-08-01 VITALS
BODY MASS INDEX: 31.18 KG/M2 | HEIGHT: 66 IN | SYSTOLIC BLOOD PRESSURE: 124 MMHG | OXYGEN SATURATION: 97 % | DIASTOLIC BLOOD PRESSURE: 70 MMHG | HEART RATE: 71 BPM | WEIGHT: 194 LBS

## 2025-08-01 DIAGNOSIS — E78.5 HYPERLIPIDEMIA, UNSPECIFIED: ICD-10-CM

## 2025-08-01 DIAGNOSIS — I10 ESSENTIAL (PRIMARY) HYPERTENSION: ICD-10-CM

## 2025-08-01 PROCEDURE — 99213 OFFICE O/P EST LOW 20 MIN: CPT | Mod: 25

## 2025-08-01 PROCEDURE — 93000 ELECTROCARDIOGRAM COMPLETE: CPT

## (undated) DEVICE — SUT QUILL MONODERM 2-0 30CM 18MM

## (undated) DEVICE — SOLIDIFIER CANN EXPRESS 3K

## (undated) DEVICE — BLADE SAFETY LOCK #10

## (undated) DEVICE — PACK MINOR

## (undated) DEVICE — DRAPE INSTRUMENT POUCH

## (undated) DEVICE — CANISTER SUCTION LID GUARD 3000CC

## (undated) DEVICE — BLANKET WARMER LOWER ADULT

## (undated) DEVICE — SAVI SCOUT HANDPIECE

## (undated) DEVICE — GLV 7.5 PROTEXIS

## (undated) DEVICE — POSITIONER STRAP ARMBOARD VELCRO TS-30 12

## (undated) DEVICE — SOL IRR POUR NS 0.9% 500ML

## (undated) DEVICE — SUT POLYSORB 3-0 30" V-20 UNDYED

## (undated) DEVICE — BOWL STERILE 32OZ BLUE

## (undated) DEVICE — DRAPE SPLIT SHEETS 77X108"

## (undated) DEVICE — SUT SOFSILK 2-0 18" C-15

## (undated) DEVICE — DRAPE 3/4 SHEET 52X76"

## (undated) DEVICE — SOL IRR POUR H2O 1500ML

## (undated) DEVICE — SUT POLYSORB 2-0 36" GS-21 UNDYED

## (undated) DEVICE — DRAPE HALF SHEET 40X57"

## (undated) DEVICE — STAPLER SKIN VISI-STAT 35 WIDE

## (undated) DEVICE — MARKER SKIN MULTI TIP 6"

## (undated) DEVICE — SUT MONOCRYL 3-0 27" PS-2 UNDYED

## (undated) DEVICE — SUT VICRYL 3-0 18" PS-2 UNDYED

## (undated) DEVICE — DRSG SURGICAL BRA W LOOPS FOR JP DRAIN 36-39"

## (undated) DEVICE — DRAIN RESERVOIR FOR JACKSON PRATT 100CC CARDINAL

## (undated) DEVICE — GLV 6.5 PROTEXIS

## (undated) DEVICE — WRAP COMPRESSION CALF MED

## (undated) DEVICE — SUT MONOSOFT 2-0 18" C-15

## (undated) DEVICE — PLASTIC SOLUTION BOWL 160Z

## (undated) DEVICE — SPONGE LAP X-RAY DETECTABLE 18X18"

## (undated) DEVICE — DRAIN BLAKE 15FR ROUND

## (undated) DEVICE — CONTAINER SPECIMEN PET

## (undated) DEVICE — DRSG MAMMARY SUPPORT LG SIZE 4

## (undated) DEVICE — POSITIONER FOAM HEAD CRADLE

## (undated) DEVICE — BLADE SAFETY LOCK #15

## (undated) DEVICE — NEEDLE COUNTER FOAM AND ADHESIVE 40-70

## (undated) DEVICE — SUT DERMABOND PRINEO 60CM